# Patient Record
Sex: FEMALE | Race: WHITE | NOT HISPANIC OR LATINO | Employment: FULL TIME | URBAN - METROPOLITAN AREA
[De-identification: names, ages, dates, MRNs, and addresses within clinical notes are randomized per-mention and may not be internally consistent; named-entity substitution may affect disease eponyms.]

---

## 2017-12-05 ENCOUNTER — ALLSCRIPTS OFFICE VISIT (OUTPATIENT)
Dept: OTHER | Facility: OTHER | Age: 52
End: 2017-12-05

## 2017-12-05 ENCOUNTER — LAB REQUISITION (OUTPATIENT)
Dept: LAB | Facility: HOSPITAL | Age: 52
End: 2017-12-05
Payer: COMMERCIAL

## 2017-12-05 DIAGNOSIS — Z11.51 ENCOUNTER FOR SCREENING FOR HUMAN PAPILLOMAVIRUS (HPV): ICD-10-CM

## 2017-12-05 DIAGNOSIS — Z01.419 ENCOUNTER FOR GYNECOLOGICAL EXAMINATION WITHOUT ABNORMAL FINDING: ICD-10-CM

## 2017-12-05 DIAGNOSIS — R53.83 OTHER FATIGUE: ICD-10-CM

## 2017-12-05 DIAGNOSIS — L65.9 NONSCARRING HAIR LOSS: ICD-10-CM

## 2017-12-05 DIAGNOSIS — Z12.31 ENCOUNTER FOR SCREENING MAMMOGRAM FOR MALIGNANT NEOPLASM OF BREAST: ICD-10-CM

## 2017-12-05 PROCEDURE — G0143 SCR C/V CYTO,THINLAYER,RESCR: HCPCS | Performed by: OBSTETRICS & GYNECOLOGY

## 2017-12-05 PROCEDURE — 87624 HPV HI-RISK TYP POOLED RSLT: CPT | Performed by: OBSTETRICS & GYNECOLOGY

## 2017-12-06 NOTE — PROGRESS NOTES
Assessment    1  Encounter for gynecological examination with abnormal finding (V72 31) (Z01 411)    Plan  Encounter for gynecological examination without abnormal finding, Screening for humanpapillomavirus (HPV)    · (1) THIN PREP PAP WITH IMAGING; Status: In Progress - Specimen/DataCollected,Retrospective Authorization;   Done: [id number]   Perform:[facility] In Office Collection; YXY:[id number]; Last Updated [facility]; 12/5/2017 6:48:21 PM;Ordered;for gynecological examination without abnormal finding, Screening for human papillomavirus (HPV); Ordered By:Kati Mclain; Maturation index required? : No  :   HPV? : Regardless of Interpretation  Encounter for screening mammogram for malignant neoplasm of breast    · * MAMMO SCREENING BILATERAL W CAD; Status:Active - Retrospective Authorization; Requested for:[id number];    Perform:[facility]; [phone]; Last Updated Sona Polanco; 12/5/2017 7:17:23 PM;Ordered;for screening mammogram for malignant neoplasm of breast; Ordered By:Kati Mclani; Fatigue    · (1) CBC/PLT/DIFF; Status:Active - Retrospective Authorization; Requestedfor:[id number];    Perform:[facility]; Due:90Kts4283; Last Updated [facility]; 12/5/2017 6:51:07 PM;Ordered;Ordered By:Kati Mclain;   · (1) COMPREHENSIVE METABOLIC PANEL; Status:Active - Retrospective Authorization; Requested for:[id number];    Perform:[facility]; Due:38Obv3538; Last Updated [facility]; 12/5/2017 6:51:07 PM;Ordered;Ordered By:Kati Mclain;   · (1) TSH WITH FT4 REFLEX; Status:Active - Retrospective Authorization; Requestedfor:[id number];    Perform:[facility]; Due:83Ifh2894; Last Updated [facility]; 12/5/2017 6:51:07 PM;Ordered;Ordered By:Kati Mclain; Fatigue, Hair loss    · (1) TESTOSTERONE, FREE (DIRECT) AND TOTAL; Status:Active - RetrospectiveAuthorization;  Requested for:[id number];    Perform:[facility]; Due:09Gzf0723; Last Updated [facility]o Noe; 12/5/2017 6:51:07 PM;Ordered;Hair loss; Ordered By:Rebecca Mclain; Discussion/Summary  health maintenance visit Currently, she eats an adequate diet and has an inadequate exercise regimen  cervical cancer screening is needed every three years HPV and Pap Co-testing Done Today Breast cancer screening: monthly self breast exam was advised, mammogram has been ordered and mammogram is needed every year  Colorectal cancer screening: the risks and benefits of colorectal cancer screening were discussed  Advice and education were given regarding weight bearing exercise and reproductive health  Patient discussion: discussed with the patient  Annual examination was completed  Pap and HPV testing was completed  Prescription was given for mammogram  An extended discussion was had regarding her situational stressors as well as menopausal symptoms  We discussed scheduling for comprehensive laboratory studies including CBC, CMP, TSH, testosterone profile  Patient encouraged to begin an exercise routine dealt with stress reduction  We discussed potential strategies to help with what appeared to be her menopausal symptoms including hormone replacement therapy  Patient will return to the office in 3-4 weeks to assess her laboratory studies and determine a action plan  The patient has the current Goals: Improve health  The patent has the current Barriers: Stressors  Patient is able to Self-Care  Chief Complaint  Yearly- Having trouble emptying her bladder, constipation, insomnia, depression, decreased libido, and hair loss  History of Present Illness  HPI: Patient returns for annual gyn visit  She has been without menses for almost the last year  She has been bothered by significant stressors related to her departure from her previous job at her [employer] she is also subsequently left her [employer]  She is bothered by insomnia  She has hot flashes and night sweats   She is bothered by some urinary emptying difficulties  She has hair loss she also has decreased libido  She does not admit to vaginal dryness  She has never had screening colonoscopy  GYN HM, [facility]: The patient is being seen for a gynecology evaluation  General Health:  Lifestyle:  She exercises regularly  She exercises 3 or more times per week  Exercise includes walking -- She does not use tobacco  The patient is a former cigarette smoker-- and-- quit smoking cigarettes: 20 years  -- She consumes alcohol  She reports occasional alcohol use  She typically drinks wine-- and-- hard liquor  -- She denies drug use  Reproductive health: the patient is postmenopausal--   she is not sexually active  -- pregnancy history: G 4P 3(miscarriages: 1 )  Screening: Cervical cancer screening includes a pap smear performed 10/27/2014,-- human papilloma virus screening performed 10/27/2014-- and-- negative pap / negative HPV  Breast cancer screening includes a mammogram performed 5/15/2015-- and-- negative  She hasn't been previously screened for colorectal cancer  Review of Systems   Constitutional: as noted in HPI   ENT: no ear ache, no loss of hearing, no nosebleeds or nasal discharge, no sore throat or hoarseness  Cardiovascular: no complaints of slow or fast heart rate, no chest pain, no palpitations, no leg claudication or lower extremity edema  Respiratory: no complaints of shortness of breath, no wheezing, no dyspnea on exertion, no orthopnea or PND  Breasts: no complaints of breast pain, breast lump or nipple discharge  Gastrointestinal: no complaints of abdominal pain, no constipation, no nausea or diarrhea, no vomiting, no bloody stools  Genitourinary: as noted in HPI  Musculoskeletal: no complaints of arthralgia, no myalgia, no joint swelling or stiffness, no limb pain or swelling  Integumentary: as noted in HPI    Neurological: no complaints of headache, no confusion, no numbness or tingling, no dizziness or fainting  Active Problems    1  Encounter for gynecological examination without abnormal finding (V72 31) (Z01 419)   2  Encounter for routine gynecological examination (V72 31) (Z01 419)   3  Encounter for screening mammogram for malignant neoplasm of breast (V76 12) (Z12 31)   4  Other specified menopausal and perimenopausal disorders (627 8) (N95 8)   5  Screening for human papillomavirus (HPV) (V73 81) (Z11 51)    Past Medical History   · History of Amenorrhea (626 0) (N91 2)   · History of acne (V13 3) (Z87 2)   · History of menorrhagia (V13 29) (Z87 42)   · History of Irritable bowel syndrome (564 1) (K58 9)    Surgical History   · History of Biopsy Breast Open   · History of Biopsy Endometrial, Without Cervical Dilation   · History of Dilation And Curettage   · History of Gallbladder Surgery    Family History  Mother    · No pertinent family history    Social History     · Former smoker (N32 99) (K46 857)    Current Meds   1  Magnesium CAPS; Therapy: (Recorded:08Byu8959) to Recorded   2  Melatonin TABS; Therapy: (Recorded:49Ump5659) to Recorded   3  Potassium TABS; Therapy: (Recorded:94Snx3295) to Recorded    Allergies  1  Penicillins    Vitals   Recorded: 07IIM3381 54:74CR   Systolic 384, LUE, Sitting   Diastolic 82, LUE, Sitting   Height 5 ft 1 in   Weight 158 lb    BMI Calculated 29 85   BSA Calculated 1 71   LMP        Physical Exam   Constitutional  General appearance: No acute distress, well appearing and well nourished  Neck  Neck: Normal, supple, trachea midline, no masses  Thyroid: Normal, no thyromegaly  Pulmonary  Respiratory effort: No increased work of breathing or signs of respiratory distress  Cardiovascular  Peripheral vascular exam: Normal pulses Throughout  Genitourinary  External genitalia: Normal and no lesions appreciated  Vagina: Normal, no lesions or dryness appreciated     Urethra: Normal    Urethral meatus: Normal    Bladder: Normal, soft, non-tender and no prolapse or masses appreciated  Cervix: Normal, no palpable masses  Uterus: Normal, non-tender, not enlarged, and no palpable masses  Adnexa/parametria: Normal, non-tender and no fullness or masses appreciated  Anus, perineum, and rectum: Normal sphincter tone, no masses, and no prolapse  Chest  Breasts: Normal and no dimpling or skin changes noted  Abdomen  Abdomen: Normal, non-tender, and no organomegaly noted  Liver and spleen: No hepatomegaly or splenomegaly  Examination for hernias: No hernias appreciated  Lymphatic  Palpation of lymph nodes in neck, axillae, groin and/or other locations: No lymphadenopathy or masses noted  Skin  Skin and subcutaneous tissue: Normal skin turgor and no rashes     Palpation of skin and subcutaneous tissue: Normal    Psychiatric  Orientation to person, place, and time: Normal    Mood and affect: Normal        Signatures   Electronically signed by : James Magana DO; Dec  5 2017  7:44PM EST                       (Author)

## 2017-12-07 ENCOUNTER — APPOINTMENT (OUTPATIENT)
Dept: LAB | Facility: CLINIC | Age: 52
End: 2017-12-07
Payer: COMMERCIAL

## 2017-12-07 DIAGNOSIS — L65.9 NONSCARRING HAIR LOSS: ICD-10-CM

## 2017-12-07 DIAGNOSIS — R53.83 OTHER FATIGUE: ICD-10-CM

## 2017-12-07 LAB
ALBUMIN SERPL BCP-MCNC: 3.8 G/DL (ref 3.5–5)
ALP SERPL-CCNC: 69 U/L (ref 46–116)
ALT SERPL W P-5'-P-CCNC: 19 U/L (ref 12–78)
ANION GAP SERPL CALCULATED.3IONS-SCNC: 6 MMOL/L (ref 4–13)
AST SERPL W P-5'-P-CCNC: 14 U/L (ref 5–45)
BASOPHILS # BLD AUTO: 0.02 THOUSANDS/ΜL (ref 0–0.1)
BASOPHILS NFR BLD AUTO: 1 % (ref 0–1)
BILIRUB SERPL-MCNC: 0.57 MG/DL (ref 0.2–1)
BUN SERPL-MCNC: 12 MG/DL (ref 5–25)
CALCIUM SERPL-MCNC: 9.1 MG/DL (ref 8.3–10.1)
CHLORIDE SERPL-SCNC: 105 MMOL/L (ref 100–108)
CO2 SERPL-SCNC: 29 MMOL/L (ref 21–32)
CREAT SERPL-MCNC: 0.81 MG/DL (ref 0.6–1.3)
EOSINOPHIL # BLD AUTO: 0.04 THOUSAND/ΜL (ref 0–0.61)
EOSINOPHIL NFR BLD AUTO: 1 % (ref 0–6)
ERYTHROCYTE [DISTWIDTH] IN BLOOD BY AUTOMATED COUNT: 12.6 % (ref 11.6–15.1)
GFR SERPL CREATININE-BSD FRML MDRD: 83 ML/MIN/1.73SQ M
GLUCOSE P FAST SERPL-MCNC: 76 MG/DL (ref 65–99)
HCT VFR BLD AUTO: 40.4 % (ref 34.8–46.1)
HGB BLD-MCNC: 13.5 G/DL (ref 11.5–15.4)
LYMPHOCYTES # BLD AUTO: 0.94 THOUSANDS/ΜL (ref 0.6–4.47)
LYMPHOCYTES NFR BLD AUTO: 25 % (ref 14–44)
MCH RBC QN AUTO: 29.5 PG (ref 26.8–34.3)
MCHC RBC AUTO-ENTMCNC: 33.4 G/DL (ref 31.4–37.4)
MCV RBC AUTO: 88 FL (ref 82–98)
MONOCYTES # BLD AUTO: 0.27 THOUSAND/ΜL (ref 0.17–1.22)
MONOCYTES NFR BLD AUTO: 7 % (ref 4–12)
NEUTROPHILS # BLD AUTO: 2.45 THOUSANDS/ΜL (ref 1.85–7.62)
NEUTS SEG NFR BLD AUTO: 66 % (ref 43–75)
NRBC BLD AUTO-RTO: 0 /100 WBCS
PLATELET # BLD AUTO: 175 THOUSANDS/UL (ref 149–390)
PMV BLD AUTO: 10.1 FL (ref 8.9–12.7)
POTASSIUM SERPL-SCNC: 3.9 MMOL/L (ref 3.5–5.3)
PROT SERPL-MCNC: 7.6 G/DL (ref 6.4–8.2)
RBC # BLD AUTO: 4.57 MILLION/UL (ref 3.81–5.12)
SODIUM SERPL-SCNC: 140 MMOL/L (ref 136–145)
TSH SERPL DL<=0.05 MIU/L-ACNC: 2.46 UIU/ML (ref 0.36–3.74)
WBC # BLD AUTO: 3.73 THOUSAND/UL (ref 4.31–10.16)

## 2017-12-07 PROCEDURE — 80053 COMPREHEN METABOLIC PANEL: CPT

## 2017-12-07 PROCEDURE — 84403 ASSAY OF TOTAL TESTOSTERONE: CPT

## 2017-12-07 PROCEDURE — 84402 ASSAY OF FREE TESTOSTERONE: CPT

## 2017-12-07 PROCEDURE — 36415 COLL VENOUS BLD VENIPUNCTURE: CPT

## 2017-12-07 PROCEDURE — 85025 COMPLETE CBC W/AUTO DIFF WBC: CPT

## 2017-12-07 PROCEDURE — 84443 ASSAY THYROID STIM HORMONE: CPT

## 2017-12-08 LAB
TESTOST FREE SERPL-MCNC: 0.4 PG/ML (ref 0–4.2)
TESTOST SERPL-MCNC: <3 NG/DL (ref 3–41)

## 2017-12-12 LAB — HPV RRNA GENITAL QL NAA+PROBE: NORMAL

## 2017-12-13 ENCOUNTER — GENERIC CONVERSION - ENCOUNTER (OUTPATIENT)
Dept: OTHER | Facility: OTHER | Age: 52
End: 2017-12-13

## 2017-12-13 LAB
LAB AP GYN PRIMARY INTERPRETATION: NORMAL
Lab: NORMAL

## 2017-12-18 ENCOUNTER — GENERIC CONVERSION - ENCOUNTER (OUTPATIENT)
Dept: OTHER | Facility: OTHER | Age: 52
End: 2017-12-18

## 2018-01-08 ENCOUNTER — GENERIC CONVERSION - ENCOUNTER (OUTPATIENT)
Dept: OTHER | Facility: OTHER | Age: 53
End: 2018-01-08

## 2018-01-23 VITALS
HEIGHT: 61 IN | DIASTOLIC BLOOD PRESSURE: 82 MMHG | SYSTOLIC BLOOD PRESSURE: 130 MMHG | WEIGHT: 158 LBS | BODY MASS INDEX: 29.83 KG/M2

## 2018-01-23 NOTE — MISCELLANEOUS
Message   Recorded as Task   Date: 12/13/2017 12:43 PM, Created By: Bowen Kyle   Task Name: Call Back   Assigned To: Rafaela Joyce   Regarding Patient: Vianca Kamara, Status: Active   Comment:    Kadi Lance - 13 Dec 2017 12:43 PM     TASK CREATED  Naima Fox- THIS PT RETURNED YOUR CALL ABOUT AN APPT  [phone]   Rafaela Joyce - 13 Dec 2017 12:51 PM     TASK REASSIGNED: Previously Assigned To Rafaela Joyce  just letting you know this pt doesn't want to make an appointment with you to come back in unless there is something wrong with her bloodwork  I made her annual apt for next year  let me know what you think but she is fine and only wants to come in if necessary   Bear Mclain - 13 Dec 2017 10:40 PM     TASK REPLIED TO: Previously Assigned To Servando Fierro  that's fine   she was suffering with hot flashes   we thought that she would come back to discuss hormone therapy   Mariella Boston - 15 Dec 2017 8:19 AM     TASK EDITED  [facility] to cb if she wants an apt   Mariella Boston - 18 Dec 2017 10:27 AM     TASK EDITED  apt made for 1/8/18 at 5:00 in [place]        Active Problems    1  Encounter for gynecological examination with abnormal finding (V72 31) (Z01 411)   2  Encounter for gynecological examination without abnormal finding (V72 31) (Z01 419)   3  Encounter for routine gynecological examination (V72 31) (Z01 419)   4  Encounter for screening mammogram for malignant neoplasm of breast (V76 12)   (Z12 31)   5  Fatigue (780 79) (R53 83)   6  Hair loss (704 00) (L65 9)   7  Other specified menopausal and perimenopausal disorders (627 8) (N95 8)   8  Screening for human papillomavirus (HPV) (V73 81) (Z11 51)    Current Meds   1  Magnesium CAPS; Therapy: (Recorded:99Deh0159) to Recorded   2  Melatonin TABS; Therapy: (Recorded:75Fhf1095) to Recorded   3  Potassium TABS; Therapy: (Recorded:37Eyn8726) to Recorded    Allergies    1   Penicillins    Signatures   Electronically signed by : Jo Suh, ; Dec 18 2017 10: 27AM EST                       (Author)

## 2018-01-23 NOTE — MISCELLANEOUS
Message   Recorded as Task   Date: 12/05/2017 07:42 PM, Created By: Edie Miranda   Task Name: Care Coordination   Assigned To: Brenita Pump   Regarding Patient: Erik Martinez, Status: Active   CommentWalt Fulling - 05 Dec 2017 7:42 PM     Erika Soliman wants to look at his schedule with you for this patient  She needs a 5:40 or after  I tried giving her a 4:40 but she is unable to make it at that time  Brenita Pump - 06 Dec 2017 3:06 PM     TASK EDITED  [facility] to cb   Anyi Boo - 11 Dec 2017 4:34 PM     TASK EDITED  pt returning call  she is @ [phone] appt for a follow up  Brenita Pump - 13 Dec 2017 12:51 PM     TASK EDITED  called pt and set up annual apt with [facility]K for next year  pt does not want to come in unless blood wrk is abnormal  tasked RK to make a decision        Active Problems    1  Encounter for gynecological examination with abnormal finding (V72 31) (Z01 411)   2  Encounter for gynecological examination without abnormal finding (V72 31) (Z01 419)   3  Encounter for routine gynecological examination (V72 31) (Z01 419)   4  Encounter for screening mammogram for malignant neoplasm of breast (V76 12)   (Z12 31)   5  Fatigue (780 79) (R53 83)   6  Hair loss (704 00) (L65 9)   7  Other specified menopausal and perimenopausal disorders (627 8) (N95 8)   8  Screening for human papillomavirus (HPV) (V73 81) (Z11 51)    Current Meds   1  Magnesium CAPS; Therapy: (Recorded:63Xur1528) to Recorded   2  Melatonin TABS; Therapy: (Recorded:56Tyk4838) to Recorded   3  Potassium TABS; Therapy: (Recorded:79Prr8792) to Recorded    Allergies    1   Penicillins    Signatures   Electronically signed by : Carmita Ashby, ; Dec 13 2017 12:52PM EST                       (Author)

## 2018-01-24 VITALS
BODY MASS INDEX: 30.78 KG/M2 | SYSTOLIC BLOOD PRESSURE: 136 MMHG | WEIGHT: 163 LBS | DIASTOLIC BLOOD PRESSURE: 84 MMHG | HEIGHT: 61 IN

## 2018-10-12 ENCOUNTER — TRANSCRIBE ORDERS (OUTPATIENT)
Dept: LAB | Facility: CLINIC | Age: 53
End: 2018-10-12

## 2018-10-12 ENCOUNTER — APPOINTMENT (OUTPATIENT)
Dept: LAB | Facility: CLINIC | Age: 53
End: 2018-10-12
Payer: COMMERCIAL

## 2018-10-12 DIAGNOSIS — G47.00 PERSISTENT DISORDER OF INITIATING OR MAINTAINING SLEEP: Primary | ICD-10-CM

## 2018-10-12 DIAGNOSIS — Z73.3 STRESS, NOT ELSEWHERE CLASSIFIED: ICD-10-CM

## 2018-10-12 DIAGNOSIS — Z00.00 ROUTINE GENERAL MEDICAL EXAMINATION AT A HEALTH CARE FACILITY: ICD-10-CM

## 2018-10-12 LAB
ALBUMIN SERPL BCP-MCNC: 3.5 G/DL (ref 3.5–5)
ALP SERPL-CCNC: 64 U/L (ref 46–116)
ALT SERPL W P-5'-P-CCNC: 19 U/L (ref 12–78)
ANION GAP SERPL CALCULATED.3IONS-SCNC: 5 MMOL/L (ref 4–13)
AST SERPL W P-5'-P-CCNC: 15 U/L (ref 5–45)
BASOPHILS # BLD AUTO: 0.02 THOUSANDS/ΜL (ref 0–0.1)
BASOPHILS NFR BLD AUTO: 1 % (ref 0–1)
BILIRUB SERPL-MCNC: 0.36 MG/DL (ref 0.2–1)
BUN SERPL-MCNC: 13 MG/DL (ref 5–25)
CALCIUM SERPL-MCNC: 8.8 MG/DL (ref 8.3–10.1)
CHLORIDE SERPL-SCNC: 108 MMOL/L (ref 100–108)
CHOLEST SERPL-MCNC: 137 MG/DL (ref 50–200)
CO2 SERPL-SCNC: 29 MMOL/L (ref 21–32)
CREAT SERPL-MCNC: 0.79 MG/DL (ref 0.6–1.3)
EOSINOPHIL # BLD AUTO: 0.04 THOUSAND/ΜL (ref 0–0.61)
EOSINOPHIL NFR BLD AUTO: 1 % (ref 0–6)
ERYTHROCYTE [DISTWIDTH] IN BLOOD BY AUTOMATED COUNT: 12.5 % (ref 11.6–15.1)
GFR SERPL CREATININE-BSD FRML MDRD: 85 ML/MIN/1.73SQ M
GLUCOSE P FAST SERPL-MCNC: 86 MG/DL (ref 65–99)
HCT VFR BLD AUTO: 38.7 % (ref 34.8–46.1)
HDLC SERPL-MCNC: 53 MG/DL (ref 40–60)
HGB BLD-MCNC: 12.7 G/DL (ref 11.5–15.4)
IMM GRANULOCYTES # BLD AUTO: 0.01 THOUSAND/UL (ref 0–0.2)
IMM GRANULOCYTES NFR BLD AUTO: 0 % (ref 0–2)
LDLC SERPL CALC-MCNC: 73 MG/DL (ref 0–100)
LYMPHOCYTES # BLD AUTO: 0.71 THOUSANDS/ΜL (ref 0.6–4.47)
LYMPHOCYTES NFR BLD AUTO: 21 % (ref 14–44)
MCH RBC QN AUTO: 29.8 PG (ref 26.8–34.3)
MCHC RBC AUTO-ENTMCNC: 32.8 G/DL (ref 31.4–37.4)
MCV RBC AUTO: 91 FL (ref 82–98)
MONOCYTES # BLD AUTO: 0.28 THOUSAND/ΜL (ref 0.17–1.22)
MONOCYTES NFR BLD AUTO: 8 % (ref 4–12)
NEUTROPHILS # BLD AUTO: 2.4 THOUSANDS/ΜL (ref 1.85–7.62)
NEUTS SEG NFR BLD AUTO: 69 % (ref 43–75)
NONHDLC SERPL-MCNC: 84 MG/DL
NRBC BLD AUTO-RTO: 0 /100 WBCS
PLATELET # BLD AUTO: 161 THOUSANDS/UL (ref 149–390)
PMV BLD AUTO: 10 FL (ref 8.9–12.7)
POTASSIUM SERPL-SCNC: 4.1 MMOL/L (ref 3.5–5.3)
PROT SERPL-MCNC: 7 G/DL (ref 6.4–8.2)
RBC # BLD AUTO: 4.26 MILLION/UL (ref 3.81–5.12)
SODIUM SERPL-SCNC: 142 MMOL/L (ref 136–145)
T4 FREE SERPL-MCNC: 0.96 NG/DL (ref 0.76–1.46)
TRIGL SERPL-MCNC: 54 MG/DL
TSH SERPL DL<=0.05 MIU/L-ACNC: 1.6 UIU/ML (ref 0.36–3.74)
WBC # BLD AUTO: 3.46 THOUSAND/UL (ref 4.31–10.16)

## 2018-10-12 PROCEDURE — 80061 LIPID PANEL: CPT

## 2018-10-12 PROCEDURE — 36415 COLL VENOUS BLD VENIPUNCTURE: CPT

## 2018-10-12 PROCEDURE — 80053 COMPREHEN METABOLIC PANEL: CPT

## 2018-10-12 PROCEDURE — 84443 ASSAY THYROID STIM HORMONE: CPT

## 2018-10-12 PROCEDURE — 84439 ASSAY OF FREE THYROXINE: CPT

## 2018-10-12 PROCEDURE — 85025 COMPLETE CBC W/AUTO DIFF WBC: CPT

## 2018-12-10 ENCOUNTER — ANNUAL EXAM (OUTPATIENT)
Dept: OBGYN CLINIC | Facility: CLINIC | Age: 53
End: 2018-12-10
Payer: COMMERCIAL

## 2018-12-10 VITALS
DIASTOLIC BLOOD PRESSURE: 80 MMHG | HEIGHT: 61 IN | SYSTOLIC BLOOD PRESSURE: 142 MMHG | WEIGHT: 168.4 LBS | BODY MASS INDEX: 31.79 KG/M2

## 2018-12-10 DIAGNOSIS — Z12.39 SCREENING FOR MALIGNANT NEOPLASM OF BREAST: ICD-10-CM

## 2018-12-10 DIAGNOSIS — Z01.419 ENCOUNTER FOR GYNECOLOGICAL EXAMINATION (GENERAL) (ROUTINE) WITHOUT ABNORMAL FINDINGS: Primary | ICD-10-CM

## 2018-12-10 PROCEDURE — S0612 ANNUAL GYNECOLOGICAL EXAMINA: HCPCS | Performed by: OBSTETRICS & GYNECOLOGY

## 2018-12-10 RX ORDER — PHENOL 1.4 %
AEROSOL, SPRAY (ML) MUCOUS MEMBRANE
COMMUNITY
End: 2018-12-10 | Stop reason: ALTCHOICE

## 2018-12-10 NOTE — PROGRESS NOTES
Assessment/Plan:    No problem-specific Assessment & Plan notes found for this encounter  Diagnoses and all orders for this visit:    Encounter for gynecological examination (general) (routine) without abnormal findings    Screening for malignant neoplasm of breast  -     Mammo screening bilateral w cad; Future    Other orders  -     Discontinue: Magnesium Oxide -Mg Supplement 400 MG CAPS; Take by mouth  -     Discontinue: Melatonin 10 MG TABS; Take by mouth  -     Discontinue: Potassium 75 MG TABS; Take by mouth        Annual examination was completed  Mammogram was ordered  Patient has received a colo guard kit through her primary care physician I have encouraged her to proceed with that testing  We then had a extended discussion regarding the challenges with management of libido and the multifactorial components impacting it  Patient will have a trial of over-the-counter Avlimil  We also discussed the benefits of arranging date nights with her  to help stimulate intimacy  Patient will call me if she has questions or concerns  She will return to the office in 1 year or as necessary  Subjective:      Patient ID: Valentin Kee is a 46 y o  female  Patient returns for annual gyn visit  She has no new medical surgical issues  Unfortunately she has severe exacerbation of depression with trial of Estratest   She continues to be disturbed with decreased libido and has continued to cause stressors in her relationship  She is open to further strategies  Gynecologic Exam         The following portions of the patient's history were reviewed and updated as appropriate:   She  has a past medical history of H/O amenorrhea; H/O menorrhagia; History of IBS; and acne    She   Patient Active Problem List    Diagnosis Date Noted    Encounter for gynecological examination (general) (routine) without abnormal findings 12/10/2018    Screening for malignant neoplasm of breast 12/10/2018     She  has a past surgical history that includes Breast biopsy; Endometrial biopsy; Dilation and curettage of uterus; and Gallbladder surgery  Her family history is not on file  She  reports that she quit smoking about 28 years ago  She has never used smokeless tobacco  She reports that she drinks alcohol  She reports that she does not use drugs  No current outpatient prescriptions on file  No current facility-administered medications for this visit  No current outpatient prescriptions on file prior to visit  No current facility-administered medications on file prior to visit  She is allergic to penicillins       Review of Systems   All other systems reviewed and are negative  Objective:      /80 (BP Location: Left arm, Patient Position: Sitting, Cuff Size: Standard)   Ht 5' 1" (1 549 m)   Wt 76 4 kg (168 lb 6 4 oz)   BMI 31 82 kg/m²          Physical Exam   Constitutional: She is oriented to person, place, and time  She appears well-developed and well-nourished  HENT:   Head: Normocephalic and atraumatic  Nose: Nose normal    Eyes: Pupils are equal, round, and reactive to light  EOM are normal    Neck: Normal range of motion  Neck supple  No thyromegaly present  Cardiovascular: Normal rate and regular rhythm  Pulmonary/Chest: Effort normal and breath sounds normal  No respiratory distress  Breasts symmetrical without masses, skin changes or adenopathy   Abdominal: Soft  Bowel sounds are normal  She exhibits no distension and no mass  There is no tenderness  Hernia confirmed negative in the right inguinal area and confirmed negative in the left inguinal area  Genitourinary: Vagina normal and uterus normal  No breast swelling, tenderness, discharge or bleeding  Pelvic exam was performed with patient supine  No labial fusion  There is no rash, tenderness, lesion or injury on the right labia  There is no rash, tenderness, lesion or injury on the left labia   Cervix exhibits no motion tenderness, no discharge and no friability  Genitourinary Comments: Ext gen nl w/o lesions  Vag healthy w/o lesions or discharge  Cervix healthy w/o lesions  Uterus nl size, NT  Adnexa NT no masses  Rectal no masses   Musculoskeletal: Normal range of motion  She exhibits no edema  Lymphadenopathy:        Right: No inguinal adenopathy present  Left: No inguinal adenopathy present  Neurological: She is alert and oriented to person, place, and time  She has normal reflexes  Skin: Skin is warm and dry  No rash noted  Psychiatric: She has a normal mood and affect  Her behavior is normal  Thought content normal    Nursing note and vitals reviewed

## 2019-02-09 ENCOUNTER — HOSPITAL ENCOUNTER (OUTPATIENT)
Dept: RADIOLOGY | Age: 54
Discharge: HOME | End: 2019-02-09
Payer: COMMERCIAL

## 2019-02-09 VITALS — BODY MASS INDEX: 31.72 KG/M2 | HEIGHT: 61 IN | WEIGHT: 168 LBS

## 2019-02-09 DIAGNOSIS — Z12.39 SCREENING FOR MALIGNANT NEOPLASM OF BREAST: ICD-10-CM

## 2019-02-09 PROCEDURE — 77067 SCR MAMMO BI INCL CAD: CPT

## 2019-06-08 ENCOUNTER — APPOINTMENT (OUTPATIENT)
Dept: RADIOLOGY | Age: 54
End: 2019-06-08
Payer: COMMERCIAL

## 2019-06-08 ENCOUNTER — TRANSCRIBE ORDERS (OUTPATIENT)
Dept: ADMINISTRATIVE | Age: 54
End: 2019-06-08

## 2019-06-08 DIAGNOSIS — R09.1 PLEURISY WITHOUT EFFUSION: ICD-10-CM

## 2019-06-08 DIAGNOSIS — R09.1 PLEURISY WITHOUT EFFUSION: Primary | ICD-10-CM

## 2019-06-08 PROCEDURE — 71046 X-RAY EXAM CHEST 2 VIEWS: CPT

## 2019-11-16 ENCOUNTER — TRANSCRIBE ORDERS (OUTPATIENT)
Dept: LAB | Facility: CLINIC | Age: 54
End: 2019-11-16

## 2019-11-16 ENCOUNTER — APPOINTMENT (OUTPATIENT)
Dept: LAB | Facility: CLINIC | Age: 54
End: 2019-11-16
Payer: COMMERCIAL

## 2019-11-16 DIAGNOSIS — Z00.00 ROUTINE GENERAL MEDICAL EXAMINATION AT A HEALTH CARE FACILITY: ICD-10-CM

## 2019-11-16 DIAGNOSIS — Z00.00 ROUTINE GENERAL MEDICAL EXAMINATION AT A HEALTH CARE FACILITY: Primary | ICD-10-CM

## 2019-11-16 LAB
ALBUMIN SERPL BCP-MCNC: 3.5 G/DL (ref 3.5–5)
ALP SERPL-CCNC: 78 U/L (ref 46–116)
ALT SERPL W P-5'-P-CCNC: 21 U/L (ref 12–78)
ANION GAP SERPL CALCULATED.3IONS-SCNC: 7 MMOL/L (ref 4–13)
AST SERPL W P-5'-P-CCNC: 18 U/L (ref 5–45)
BASOPHILS # BLD AUTO: 0.03 THOUSANDS/ΜL (ref 0–0.1)
BASOPHILS NFR BLD AUTO: 1 % (ref 0–1)
BILIRUB SERPL-MCNC: 0.6 MG/DL (ref 0.2–1)
BUN SERPL-MCNC: 13 MG/DL (ref 5–25)
CALCIUM SERPL-MCNC: 8.5 MG/DL (ref 8.3–10.1)
CHLORIDE SERPL-SCNC: 105 MMOL/L (ref 100–108)
CHOLEST SERPL-MCNC: 170 MG/DL (ref 50–200)
CO2 SERPL-SCNC: 30 MMOL/L (ref 21–32)
CREAT SERPL-MCNC: 0.92 MG/DL (ref 0.6–1.3)
EOSINOPHIL # BLD AUTO: 0.09 THOUSAND/ΜL (ref 0–0.61)
EOSINOPHIL NFR BLD AUTO: 2 % (ref 0–6)
ERYTHROCYTE [DISTWIDTH] IN BLOOD BY AUTOMATED COUNT: 12.9 % (ref 11.6–15.1)
GFR SERPL CREATININE-BSD FRML MDRD: 70 ML/MIN/1.73SQ M
GLUCOSE P FAST SERPL-MCNC: 82 MG/DL (ref 65–99)
HCT VFR BLD AUTO: 39.4 % (ref 34.8–46.1)
HDLC SERPL-MCNC: 55 MG/DL
HGB BLD-MCNC: 12.7 G/DL (ref 11.5–15.4)
IMM GRANULOCYTES # BLD AUTO: 0.01 THOUSAND/UL (ref 0–0.2)
IMM GRANULOCYTES NFR BLD AUTO: 0 % (ref 0–2)
LDLC SERPL CALC-MCNC: 104 MG/DL (ref 0–100)
LYMPHOCYTES # BLD AUTO: 0.83 THOUSANDS/ΜL (ref 0.6–4.47)
LYMPHOCYTES NFR BLD AUTO: 21 % (ref 14–44)
MCH RBC QN AUTO: 29.4 PG (ref 26.8–34.3)
MCHC RBC AUTO-ENTMCNC: 32.2 G/DL (ref 31.4–37.4)
MCV RBC AUTO: 91 FL (ref 82–98)
MONOCYTES # BLD AUTO: 0.33 THOUSAND/ΜL (ref 0.17–1.22)
MONOCYTES NFR BLD AUTO: 8 % (ref 4–12)
NEUTROPHILS # BLD AUTO: 2.68 THOUSANDS/ΜL (ref 1.85–7.62)
NEUTS SEG NFR BLD AUTO: 68 % (ref 43–75)
NONHDLC SERPL-MCNC: 115 MG/DL
NRBC BLD AUTO-RTO: 0 /100 WBCS
PLATELET # BLD AUTO: 150 THOUSANDS/UL (ref 149–390)
PMV BLD AUTO: 8.9 FL (ref 8.9–12.7)
POTASSIUM SERPL-SCNC: 3.9 MMOL/L (ref 3.5–5.3)
PROT SERPL-MCNC: 7.3 G/DL (ref 6.4–8.2)
RBC # BLD AUTO: 4.32 MILLION/UL (ref 3.81–5.12)
SODIUM SERPL-SCNC: 142 MMOL/L (ref 136–145)
TRIGL SERPL-MCNC: 57 MG/DL
TSH SERPL DL<=0.05 MIU/L-ACNC: 2.08 UIU/ML (ref 0.36–3.74)
WBC # BLD AUTO: 3.97 THOUSAND/UL (ref 4.31–10.16)

## 2019-11-16 PROCEDURE — 80053 COMPREHEN METABOLIC PANEL: CPT

## 2019-11-16 PROCEDURE — 36415 COLL VENOUS BLD VENIPUNCTURE: CPT

## 2019-11-16 PROCEDURE — 84443 ASSAY THYROID STIM HORMONE: CPT

## 2019-11-16 PROCEDURE — 85025 COMPLETE CBC W/AUTO DIFF WBC: CPT

## 2019-11-16 PROCEDURE — 80061 LIPID PANEL: CPT

## 2020-01-21 ENCOUNTER — ANNUAL EXAM (OUTPATIENT)
Dept: OBGYN CLINIC | Facility: CLINIC | Age: 55
End: 2020-01-21
Payer: COMMERCIAL

## 2020-01-21 VITALS
SYSTOLIC BLOOD PRESSURE: 138 MMHG | DIASTOLIC BLOOD PRESSURE: 88 MMHG | BODY MASS INDEX: 33.91 KG/M2 | HEIGHT: 61 IN | WEIGHT: 179.6 LBS

## 2020-01-21 DIAGNOSIS — Z12.31 ENCOUNTER FOR SCREENING MAMMOGRAM FOR MALIGNANT NEOPLASM OF BREAST: ICD-10-CM

## 2020-01-21 DIAGNOSIS — Z01.419 ENCOUNTER FOR GYNECOLOGICAL EXAMINATION (GENERAL) (ROUTINE) WITHOUT ABNORMAL FINDINGS: Primary | ICD-10-CM

## 2020-01-21 DIAGNOSIS — Z87.42 HISTORY OF POSTMENOPAUSAL BLEEDING: ICD-10-CM

## 2020-01-21 PROCEDURE — S0612 ANNUAL GYNECOLOGICAL EXAMINA: HCPCS | Performed by: STUDENT IN AN ORGANIZED HEALTH CARE EDUCATION/TRAINING PROGRAM

## 2020-01-21 NOTE — PROGRESS NOTES
Established patient annual exam - Gynecology   Miguel Sharma 53 y o  female MRN: [id number]  227 M  [facility]  Encounter: [id number]    Chief Complaint   Patient presents with    Gynecologic Exam     Yearly exam, last pap was 17 neg/HPV neg, mammo done 19, order given  Colonoscopy has not been done  She is , has no libido  History of Present Illness     Miguel Sharma is a 53 y o  female  No LMP recorded  Patient is postmenopausal  postmenopausal, who presents for for annual exam   Last seen by Dr Marco Rubio 12/10/2018  Concerns today: Had small amount of spotting on her underwear approximately 3 months ago  Also continues with symptoms of low libido  Did take Dr Angel Gibbons advice of setting a date night  She reports it did go well but otherwise has not had any improvement in her symptoms  Tried treatment but worsened depression  REVIEW OF SYSTEMS  CONSTITUTIONAL:  No weight loss, fever, chills, weakness  HEENT: No visual loss, blurred vision  Vertigo baseline  SKIN: No rash or itching  CARDIOVASCULAR: No chest pain, chest pressure, or chest discomfort  RESPIRATORY: No shortness of breath, cough or sputum  GASTROINTESTINAL: No nausea, emesis, or diarrhea  NEUROLOGICAL: No headache, dizziness, syncope, paralysis  MUSCUOSKELETAL: No muscle, back pain, joint stiffness or bruising  INFECTIOUS: No fever, chills  PSYCHIATRIC: No disorder of thought or mood  HEMATOLOGIC: No easy bruising or bleeding  GYN: Postmenopausal bleeding as described above    Decreased libido as above    Past Medical History:   Diagnosis Date    H/O amenorrhea     H/O menorrhagia     History of IBS     Hx of acne        Patient Active Problem List   Diagnosis    Encounter for gynecological examination (general) (routine) without abnormal findings    Screening for malignant neoplasm of breast       Past Surgical History:   Procedure Laterality Date    BREAST EXCISIONAL BIOPSY Right     DILATION AND CURETTAGE OF UTERUS      ENDOMETRIAL BIOPSY      GALLBLADDER SURGERY         OB History        4    Para   3    Term   3            AB   1    Living           SAB   1    TAB        Ectopic        Multiple        Live Births   3                 #: 1, Date: None, Sex: None, Weight: None, GA: None, Delivery: None, Apgar1: None, Apgar5: None, Living: None, Birth Comments: None    #: 2, Date: None, Sex: None, Weight: None, GA: None, Delivery: None, Apgar1: None, Apgar5: None, Living: None, Birth Comments: None    #: 3, Date: None, Sex: None, Weight: None, GA: None, Delivery: None, Apgar1: None, Apgar5: None, Living: None, Birth Comments: None    #: 4, Date: None, Sex: None, Weight: None, GA: None, Delivery: None, Apgar1: None, Apgar5: None, Living: None, Birth Comments: None       GYN History    LMP years ago    No history abnormal Pap smears  No history of cryotherapy, LEEP, or cold knife cone of the cervix    Health maintenance  Last pap smear: 2017 NILM, HPV neg  Bone density scan: n/a  Last mammogram: 2019 birads cat 1 neg  Last colonoscopy: Not on file declined cologard and colonoscopy, discussed with PCP  HPV vaccination?: no    Family History   Problem Relation Age of Onset    Colon cancer Maternal Grandmother 66       Social History   Social History     Substance and Sexual Activity   Alcohol Use Yes    Comment: occasional     Social History     Substance and Sexual Activity   Drug Use No     Social History     Tobacco Use   Smoking Status Former Smoker    Last attempt to quit: 1990    Years since quittin 0   Smokeless Tobacco Never Used       Sexually active? occasionally  Current sexual partner(s):   History of STD: no  Interested in STD screening today? no  Concerns about sex: low libido      No current outpatient medications on file      Allergies   Allergen Reactions    Penicillins Rash       Objective   Vitals: Blood pressure 138/88, height 5' 1 25" (1 556 m), weight 81 5 kg (179 lb 9 6 oz), not currently breastfeeding  Body mass index is 33 66 kg/m²  General: NAD, AAOx3  Heart: RRR  Lungs: CTAB  Neck: supple, no thyromegaly or thyroid nodules appreciated    Breast: nipples everted bilaterally, no skin changes  No dimpling, redness, or erythema  No breast masses or axillary masses bilaterally  Fibrocystic breast changes  Abdomen: soft, non-distended, non tender to palpation  Extremities: non-tender to palpation  Speculum exam: Normal appearing external genitalia, normal hair distribution  Urethra well-suspended, no clitoromegaly noted  Vagina pale, minimal rugation  Physiologic discharge noted  Cervix without lesion, postmenopausal appearing  No blood in vaginal vault    Pelvic exam: No cervical motion tenderness  No adnexal masses or tenderness  anteverted uterus, normal size  Lab Results:   No visits with results within 1 Day(s) from this visit     Latest known visit with results is:   Appointment on 11/16/2019   Component Date Value    Sodium 11/16/2019 142     Potassium 11/16/2019 3 9     Chloride 11/16/2019 105     CO2 11/16/2019 30     ANION GAP 11/16/2019 7     BUN 11/16/2019 13     Creatinine 11/16/2019 0 92     Glucose, Fasting 11/16/2019 82     Calcium 11/16/2019 8 5     AST 11/16/2019 18     ALT 11/16/2019 21     Alkaline Phosphatase 11/16/2019 78     Total Protein 11/16/2019 7 3     Albumin 11/16/2019 3 5     Total Bilirubin 11/16/2019 0 60     eGFR 11/16/2019 70     WBC 11/16/2019 3 97*    RBC 11/16/2019 4 32     Hemoglobin 11/16/2019 12 7     Hematocrit 11/16/2019 39 4     MCV 11/16/2019 91     MCH 11/16/2019 29 4     MCHC 11/16/2019 32 2     RDW 11/16/2019 12 9     MPV 11/16/2019 8 9     Platelets 49/66/0116 150     nRBC 11/16/2019 0     Neutrophils Relative 11/16/2019 68     Immat GRANS % 11/16/2019 0     Lymphocytes Relative 11/16/2019 21     Monocytes Relative 2019 8     Eosinophils Relative 2019 2     Basophils Relative 2019 1     Neutrophils Absolute 2019 2 68     Immature Grans Absolute 2019 0 01     Lymphocytes Absolute 2019 0 83     Monocytes Absolute 2019 0 33     Eosinophils Absolute 2019 0 09     Basophils Absolute 2019 0 03     Cholesterol 2019 170     Triglycerides 2019 57     HDL, Direct 2019 55     LDL Calculated 2019 104*    Non-HDL-Chol (CHOL-HDL) 2019 115     TSH 3RD GENERATON 2019 2 084         Assessment/Plan     53 y o   with normal annual gynecologic examination  Diagnoses and all orders for this visit:    Encounter for gynecological examination (general) (routine) without abnormal findings   - No abnormalities on exam today  Reviewed decreased libido, cycle of desire  Also reviewed depression and effect on libido  Not interested in any medication at this time, will inform us if she does want  Encounter for screening mammogram for malignant neoplasm of breast  -     Mammo screening bilateral w cad; Future   - Reordered today, scheduled  History of postmenopausal bleeding  -     US pelvis complete w transvaginal; Future   - Reviewed risk of postmenopausal bleeding, rule out endometrial cancer  Plan at this time to get transvaginal ultrasound to assess endometrial cavity thickness    If >4 mm, will plan for sampling in the form of endometrial biopsy    Follow up PRN or for one year annual exam

## 2020-02-07 ENCOUNTER — TELEPHONE (OUTPATIENT)
Dept: OBGYN CLINIC | Facility: CLINIC | Age: 55
End: 2020-02-07

## 2020-02-07 NOTE — TELEPHONE ENCOUNTER
Pt contacted and advised friendly reminder showing Abiela Ponce is scheduled for 02/15/2020 and thanked her  Advised pelvic u/s due not showing scheduled  Asked if she needed copy of order  Pt stated she is still thinking about it  Doesn't know she will have it done or not

## 2020-02-15 ENCOUNTER — HOSPITAL ENCOUNTER (OUTPATIENT)
Dept: RADIOLOGY | Age: 55
Discharge: HOME | End: 2020-02-15
Payer: COMMERCIAL

## 2020-02-15 VITALS — WEIGHT: 179 LBS | BODY MASS INDEX: 33.79 KG/M2 | HEIGHT: 61 IN

## 2020-02-15 DIAGNOSIS — Z12.31 ENCOUNTER FOR SCREENING MAMMOGRAM FOR MALIGNANT NEOPLASM OF BREAST: ICD-10-CM

## 2020-02-15 PROCEDURE — 77067 SCR MAMMO BI INCL CAD: CPT

## 2020-12-19 ENCOUNTER — TRANSCRIBE ORDERS (OUTPATIENT)
Dept: LAB | Facility: CLINIC | Age: 55
End: 2020-12-19

## 2020-12-19 ENCOUNTER — LAB (OUTPATIENT)
Dept: LAB | Facility: CLINIC | Age: 55
End: 2020-12-19
Payer: COMMERCIAL

## 2020-12-19 DIAGNOSIS — Z00.00 ROUTINE GENERAL MEDICAL EXAMINATION AT A HEALTH CARE FACILITY: Primary | ICD-10-CM

## 2020-12-19 DIAGNOSIS — Z00.00 ROUTINE GENERAL MEDICAL EXAMINATION AT A HEALTH CARE FACILITY: ICD-10-CM

## 2020-12-19 LAB
ALBUMIN SERPL BCP-MCNC: 3.5 G/DL (ref 3.5–5)
ALP SERPL-CCNC: 67 U/L (ref 46–116)
ALT SERPL W P-5'-P-CCNC: 19 U/L (ref 12–78)
ANION GAP SERPL CALCULATED.3IONS-SCNC: 8 MMOL/L (ref 4–13)
AST SERPL W P-5'-P-CCNC: 16 U/L (ref 5–45)
BASOPHILS # BLD AUTO: 0.03 THOUSANDS/ΜL (ref 0–0.1)
BASOPHILS NFR BLD AUTO: 1 % (ref 0–1)
BILIRUB SERPL-MCNC: 0.48 MG/DL (ref 0.2–1)
BUN SERPL-MCNC: 17 MG/DL (ref 5–25)
CALCIUM SERPL-MCNC: 8.6 MG/DL (ref 8.3–10.1)
CHLORIDE SERPL-SCNC: 106 MMOL/L (ref 100–108)
CHOLEST SERPL-MCNC: 185 MG/DL (ref 50–200)
CO2 SERPL-SCNC: 28 MMOL/L (ref 21–32)
CREAT SERPL-MCNC: 0.9 MG/DL (ref 0.6–1.3)
EOSINOPHIL # BLD AUTO: 0.1 THOUSAND/ΜL (ref 0–0.61)
EOSINOPHIL NFR BLD AUTO: 3 % (ref 0–6)
ERYTHROCYTE [DISTWIDTH] IN BLOOD BY AUTOMATED COUNT: 12.5 % (ref 11.6–15.1)
GFR SERPL CREATININE-BSD FRML MDRD: 72 ML/MIN/1.73SQ M
GLUCOSE P FAST SERPL-MCNC: 82 MG/DL (ref 65–99)
HCT VFR BLD AUTO: 41.2 % (ref 34.8–46.1)
HDLC SERPL-MCNC: 65 MG/DL
HGB BLD-MCNC: 13.7 G/DL (ref 11.5–15.4)
IMM GRANULOCYTES # BLD AUTO: 0.01 THOUSAND/UL (ref 0–0.2)
IMM GRANULOCYTES NFR BLD AUTO: 0 % (ref 0–2)
LDLC SERPL CALC-MCNC: 108 MG/DL (ref 0–100)
LYMPHOCYTES # BLD AUTO: 0.79 THOUSANDS/ΜL (ref 0.6–4.47)
LYMPHOCYTES NFR BLD AUTO: 21 % (ref 14–44)
MCH RBC QN AUTO: 30.6 PG (ref 26.8–34.3)
MCHC RBC AUTO-ENTMCNC: 33.3 G/DL (ref 31.4–37.4)
MCV RBC AUTO: 92 FL (ref 82–98)
MONOCYTES # BLD AUTO: 0.27 THOUSAND/ΜL (ref 0.17–1.22)
MONOCYTES NFR BLD AUTO: 7 % (ref 4–12)
NEUTROPHILS # BLD AUTO: 2.5 THOUSANDS/ΜL (ref 1.85–7.62)
NEUTS SEG NFR BLD AUTO: 68 % (ref 43–75)
NONHDLC SERPL-MCNC: 120 MG/DL
NRBC BLD AUTO-RTO: 0 /100 WBCS
PLATELET # BLD AUTO: 161 THOUSANDS/UL (ref 149–390)
PMV BLD AUTO: 9.6 FL (ref 8.9–12.7)
POTASSIUM SERPL-SCNC: 3.8 MMOL/L (ref 3.5–5.3)
PROT SERPL-MCNC: 7 G/DL (ref 6.4–8.2)
RBC # BLD AUTO: 4.48 MILLION/UL (ref 3.81–5.12)
SODIUM SERPL-SCNC: 142 MMOL/L (ref 136–145)
TRIGL SERPL-MCNC: 61 MG/DL
TSH SERPL DL<=0.05 MIU/L-ACNC: 1.68 UIU/ML (ref 0.36–3.74)
WBC # BLD AUTO: 3.7 THOUSAND/UL (ref 4.31–10.16)

## 2020-12-19 PROCEDURE — 80053 COMPREHEN METABOLIC PANEL: CPT

## 2020-12-19 PROCEDURE — 80061 LIPID PANEL: CPT

## 2020-12-19 PROCEDURE — 36415 COLL VENOUS BLD VENIPUNCTURE: CPT

## 2020-12-19 PROCEDURE — 84443 ASSAY THYROID STIM HORMONE: CPT

## 2020-12-19 PROCEDURE — 85025 COMPLETE CBC W/AUTO DIFF WBC: CPT

## 2021-02-19 ENCOUNTER — TELEPHONE (OUTPATIENT)
Dept: OBGYN CLINIC | Facility: CLINIC | Age: 56
End: 2021-02-19

## 2021-02-19 NOTE — TELEPHONE ENCOUNTER
Pt called and stated she needed mammo script for upcoming appt  I told pt I would have that put in for her         **after getting off phone, I checked pt's chart and she already has a script in that doesn't  until   Pt does not need a new script

## 2021-02-27 ENCOUNTER — HOSPITAL ENCOUNTER (OUTPATIENT)
Dept: RADIOLOGY | Age: 56
Discharge: HOME | End: 2021-02-27
Payer: COMMERCIAL

## 2021-02-27 VITALS — WEIGHT: 176 LBS | BODY MASS INDEX: 33.23 KG/M2 | HEIGHT: 61 IN

## 2021-02-27 DIAGNOSIS — Z12.31 ENCOUNTER FOR SCREENING MAMMOGRAM FOR MALIGNANT NEOPLASM OF BREAST: ICD-10-CM

## 2021-02-27 PROCEDURE — 77067 SCR MAMMO BI INCL CAD: CPT

## 2021-02-27 PROCEDURE — 77063 BREAST TOMOSYNTHESIS BI: CPT

## 2021-03-24 ENCOUNTER — ANNUAL EXAM (OUTPATIENT)
Dept: OBGYN CLINIC | Facility: CLINIC | Age: 56
End: 2021-03-24
Payer: COMMERCIAL

## 2021-03-24 VITALS
HEIGHT: 61 IN | BODY MASS INDEX: 32.74 KG/M2 | DIASTOLIC BLOOD PRESSURE: 92 MMHG | WEIGHT: 173.4 LBS | SYSTOLIC BLOOD PRESSURE: 126 MMHG

## 2021-03-24 DIAGNOSIS — R68.82 DECREASED LIBIDO: ICD-10-CM

## 2021-03-24 DIAGNOSIS — Z01.419 WOMEN'S ANNUAL ROUTINE GYNECOLOGICAL EXAMINATION: Primary | ICD-10-CM

## 2021-03-24 DIAGNOSIS — Z12.11 SCREENING FOR COLON CANCER: ICD-10-CM

## 2021-03-24 DIAGNOSIS — N93.0 POSTCOITAL BLEEDING: ICD-10-CM

## 2021-03-24 PROBLEM — R39.15 URINARY URGENCY: Status: ACTIVE | Noted: 2021-03-24

## 2021-03-24 PROCEDURE — S0612 ANNUAL GYNECOLOGICAL EXAMINA: HCPCS | Performed by: STUDENT IN AN ORGANIZED HEALTH CARE EDUCATION/TRAINING PROGRAM

## 2021-03-24 NOTE — ASSESSMENT & PLAN NOTE
Single episode  No additional episodes of postmenopausal bleeding - notes was spotting only  Reviewed low threshold to get pelvic ultrasound to evaluate further - did not have done after episode last year  Will continue to monitor

## 2021-03-24 NOTE — ASSESSMENT & PLAN NOTE
Worsened secondary to pain with intercourse, dryness  Previously using [address], placement  Potential for premarin if this does not resolve issues

## 2021-03-24 NOTE — PROGRESS NOTES
Established patient annual exam - Gynecology    Chief complaint: annual exam    Chief Complaint   Patient presents with    Gynecologic Exam     annual exam    Last pap: 17-neg  Last mammo: 21-neg  Last colonoscopy: never  Assessment/Plan     63 y o  [id number] normal annual gynecologic examination  Problem List Items Addressed This Visit        Other    Decreased libido     Worsened secondary to pain with intercourse, dryness  Previously using [address] [place] [address] Street, placement  Potential for premarin if this does not resolve issues         Postcoital bleeding     Single episode  No additional episodes of postmenopausal bleeding - notes was spotting only  Reviewed low threshold to get pelvic ultrasound to evaluate further - did not have done after episode last year  Will continue to monitor           Other Visit Diagnoses     Women's annual routine gynecological examination    -  Primary    Screening for colon cancer        Relevant Orders    Ambulatory referral to Gastroenterology        Normal findings on routine gynecologic exam today  Reviewed annual screening mammogram and annual clinical breast exam     Discussed ASCCP guidelines and Pap smear with cotesting frequency, up to date today  STD/STI testing offered, declined today - reports low risk  Encouraged daily calcium and vitamin D intake was well as weight bearing exercise daily for bone health  Follow up PRN or for annual exam   --------------------------------------------------------  History of Present Illness     Richar Oneil is a 63 y o  female  No LMP recorded (lmp unknown)  Patient is postmenopausal  who presents for annual examination      Concerns today: routine checkup    New updates: no further postmenopausal bleeding - the time she did have was a small dot, also secondary to vaginal dryness    Health maintenance  Last pap smear: 2017 NILM / HPV neg  Bone density scan: n/a  Last mammogram: 2021 negative  Last colonoscopy: Not on file has not had  HPV vaccination?: no    GYN History  Menarche age [age]  No LMP recorded (lmp unknown)   Patient is postmenopausal    Approximately age 47    No regular periods  No history abnormal Pap smears  No history of cryotherapy, LEEP, or cold knife cone of the cervix    Sexually active? yes  Current sexual partner(s):   History of STD: no  Interested in STD screening today? no  Concerns about sex: as above    Occupation: for an     OB History    Para Term  AB Living   4 3 3   1     SAB TAB Ectopic Multiple Live Births   1       3      # Outcome Date GA Lbr Eliseo/2nd Weight Sex Delivery Anes PTL Lv   4 SAB            3 Term            2 Term            1 Term              # 1 - Date: None, Sex: None, Weight: None, GA: None, Delivery: None, Apgar1: None, Apgar5: None, Living: None, Birth Comments: None    # 2 - Date: None, Sex: None, Weight: None, GA: None, Delivery: None, Apgar1: None, Apgar5: None, Living: None, Birth Comments: None    # 3 - Date: None, Sex: None, Weight: None, GA: None, Delivery: None, Apgar1: None, Apgar5: None, Living: None, Birth Comments: None    # 4 - Date: None, Sex: None, Weight: None, GA: None, Delivery: None, Apgar1: None, Apgar5: None, Living: None, Birth Comments: None    Past Surgical History:   Procedure Laterality Date    BREAST EXCISIONAL BIOPSY Right     benign    DILATION AND CURETTAGE OF UTERUS      ENDOMETRIAL BIOPSY      GALLBLADDER SURGERY       Family History   Problem Relation Age of Onset    Colon cancer Maternal Grandmother 66    No Known Problems Mother     No Known Problems Father     No Known Problems Sister     No Known Problems Maternal Grandfather     No Known Problems Paternal Grandmother     No Known Problems Paternal Grandfather     No Known Problems Brother     No Known Problems Son     No Known Problems Son     No Known Problems Son     No Known Problems Maternal Aunt     No Known Problems Maternal Aunt      Social History   Social History     Substance and Sexual Activity   Alcohol Use Yes    Comment: occasional     Social History     Substance and Sexual Activity   Drug Use No     Social History     Tobacco Use   Smoking Status Former Smoker    Types: Cigarettes    Quit date: 200    Years since quittin 2   Smokeless Tobacco Never Used     No current outpatient medications on file  Allergies   Allergen Reactions    Penicillins Rash     REVIEW OF SYSTEMS  CONSTITUTIONAL:  No weight loss, fever, chills, weakness  HEENT: No visual loss, blurred vision  SKIN: No rash or itching  CARDIOVASCULAR: No chest pain, chest pressure, or chest discomfort  RESPIRATORY: No shortness of breath, cough or sputum  GASTROINTESTINAL: No nausea, emesis, or diarrhea  NEUROLOGICAL: No headache, dizziness, syncope, paralysis  MUSCUOSKELETAL: No muscle, back pain, joint stiffness or bruising  INFECTIOUS: No fever, chills  PSYCHIATRIC: No disorder of thought or mood  HEMATOLOGIC: No easy bruising or bleeding  GYN: No postmenopausal bleeding  No involuntary urine loss  Some pain with intercourse due to vaginal dryness    Objective   Vitals: Blood pressure 126/92, height 5' 1" (1 549 m), weight 78 7 kg (173 lb 6 4 oz), not currently breastfeeding  Body mass index is 32 76 kg/m²  General: NAD, AAOx3  Heart: RRR  Lungs: CTAB  Neck: supple, no thyromegaly or thyroid nodules appreciated  Breast: nipples everted bilaterally, no skin changes  No dimpling, redness, or erythema  No breast masses or axillary masses bilaterally  Fibrocystic breast changes  Abdomen: soft, non-distended, non tender to palpation  Extremities: non-tender to palpation  Speculum exam: Normal appearing external genitalia, normal hair distribution  Urethra well-suspended, no clitoromegaly noted  Vagina pale minimal rugation  scant discharge noted  Cervix without lesion, parous appearing   no blood in vaginal vault    Pelvic exam: no cervical motion tenderness  No adnexal masses or tenderness  anteverted uterus, normal size  Lab Results:   No visits with results within 1 Day(s) from this visit     Latest known visit with results is:   Lab on 12/19/2020   Component Date Value    WBC 12/19/2020 3 70*    RBC 12/19/2020 4 48     Hemoglobin 12/19/2020 13 7     Hematocrit 12/19/2020 41 2     MCV 12/19/2020 92     MCH 12/19/2020 30 6     MCHC 12/19/2020 33 3     RDW 12/19/2020 12 5     MPV 12/19/2020 9 6     Platelets 81/45/7488 161     nRBC 12/19/2020 0     Neutrophils Relative 12/19/2020 68     Immat GRANS % 12/19/2020 0     Lymphocytes Relative 12/19/2020 21     Monocytes Relative 12/19/2020 7     Eosinophils Relative 12/19/2020 3     Basophils Relative 12/19/2020 1     Neutrophils Absolute 12/19/2020 2 50     Immature Grans Absolute 12/19/2020 0 01     Lymphocytes Absolute 12/19/2020 0 79     Monocytes Absolute 12/19/2020 0 27     Eosinophils Absolute 12/19/2020 0 10     Basophils Absolute 12/19/2020 0 03     Sodium 12/19/2020 142     Potassium 12/19/2020 3 8     Chloride 12/19/2020 106     CO2 12/19/2020 28     ANION GAP 12/19/2020 8     BUN 12/19/2020 17     Creatinine 12/19/2020 0 90     Glucose, Fasting 12/19/2020 82     Calcium 12/19/2020 8 6     AST 12/19/2020 16     ALT 12/19/2020 19     Alkaline Phosphatase 12/19/2020 67     Total Protein 12/19/2020 7 0     Albumin 12/19/2020 3 5     Total Bilirubin 12/19/2020 0 48     eGFR 12/19/2020 72     Cholesterol 12/19/2020 185     Triglycerides 12/19/2020 61     HDL, Direct 12/19/2020 65     LDL Calculated 12/19/2020 108*    Non-HDL-Chol (CHOL-HDL) 12/19/2020 120     TSH 3RD GENERATON 12/19/2020 1 677

## 2021-04-16 DIAGNOSIS — Z23 ENCOUNTER FOR IMMUNIZATION: ICD-10-CM

## 2021-05-05 ENCOUNTER — IMMUNIZATIONS (OUTPATIENT)
Dept: FAMILY MEDICINE CLINIC | Facility: HOSPITAL | Age: 56
End: 2021-05-05

## 2021-05-05 DIAGNOSIS — Z23 ENCOUNTER FOR IMMUNIZATION: Primary | ICD-10-CM

## 2021-05-05 PROCEDURE — 0001A SARS-COV-2 / COVID-19 MRNA VACCINE (PFIZER-BIONTECH) 30 MCG: CPT

## 2021-05-05 PROCEDURE — 91300 SARS-COV-2 / COVID-19 MRNA VACCINE (PFIZER-BIONTECH) 30 MCG: CPT

## 2021-05-27 ENCOUNTER — IMMUNIZATIONS (OUTPATIENT)
Dept: FAMILY MEDICINE CLINIC | Facility: HOSPITAL | Age: 56
End: 2021-05-27

## 2021-05-27 DIAGNOSIS — Z23 ENCOUNTER FOR IMMUNIZATION: Primary | ICD-10-CM

## 2021-05-27 PROCEDURE — 0002A SARS-COV-2 / COVID-19 MRNA VACCINE (PFIZER-BIONTECH) 30 MCG: CPT | Performed by: PHYSICIAN ASSISTANT

## 2021-05-27 PROCEDURE — 91300 SARS-COV-2 / COVID-19 MRNA VACCINE (PFIZER-BIONTECH) 30 MCG: CPT | Performed by: PHYSICIAN ASSISTANT

## 2021-10-09 ENCOUNTER — APPOINTMENT (OUTPATIENT)
Dept: LAB | Facility: CLINIC | Age: 56
End: 2021-10-09
Payer: COMMERCIAL

## 2021-10-09 DIAGNOSIS — J30.9 SPASMODIC RHINORRHEA: ICD-10-CM

## 2021-10-09 DIAGNOSIS — I10 ESSENTIAL HYPERTENSION, MALIGNANT: ICD-10-CM

## 2021-10-09 LAB
ALBUMIN SERPL BCP-MCNC: 3.5 G/DL (ref 3.5–5)
ALP SERPL-CCNC: 74 U/L (ref 46–116)
ALT SERPL W P-5'-P-CCNC: 19 U/L (ref 12–78)
ANION GAP SERPL CALCULATED.3IONS-SCNC: 8 MMOL/L (ref 4–13)
AST SERPL W P-5'-P-CCNC: 15 U/L (ref 5–45)
BASOPHILS # BLD AUTO: 0.02 THOUSANDS/ΜL (ref 0–0.1)
BASOPHILS NFR BLD AUTO: 1 % (ref 0–1)
BILIRUB SERPL-MCNC: 0.46 MG/DL (ref 0.2–1)
BUN SERPL-MCNC: 15 MG/DL (ref 5–25)
CALCIUM SERPL-MCNC: 8.8 MG/DL (ref 8.3–10.1)
CHLORIDE SERPL-SCNC: 106 MMOL/L (ref 100–108)
CHOLEST SERPL-MCNC: 163 MG/DL (ref 50–200)
CO2 SERPL-SCNC: 28 MMOL/L (ref 21–32)
CREAT SERPL-MCNC: 0.92 MG/DL (ref 0.6–1.3)
EOSINOPHIL # BLD AUTO: 0.11 THOUSAND/ΜL (ref 0–0.61)
EOSINOPHIL NFR BLD AUTO: 3 % (ref 0–6)
ERYTHROCYTE [DISTWIDTH] IN BLOOD BY AUTOMATED COUNT: 12.4 % (ref 11.6–15.1)
GFR SERPL CREATININE-BSD FRML MDRD: 69 ML/MIN/1.73SQ M
GLUCOSE P FAST SERPL-MCNC: 81 MG/DL (ref 65–99)
HCT VFR BLD AUTO: 40 % (ref 34.8–46.1)
HDLC SERPL-MCNC: 57 MG/DL
HGB BLD-MCNC: 13 G/DL (ref 11.5–15.4)
IMM GRANULOCYTES # BLD AUTO: 0.01 THOUSAND/UL (ref 0–0.2)
IMM GRANULOCYTES NFR BLD AUTO: 0 % (ref 0–2)
LDLC SERPL CALC-MCNC: 93 MG/DL (ref 0–100)
LYMPHOCYTES # BLD AUTO: 0.93 THOUSANDS/ΜL (ref 0.6–4.47)
LYMPHOCYTES NFR BLD AUTO: 24 % (ref 14–44)
MCH RBC QN AUTO: 30.2 PG (ref 26.8–34.3)
MCHC RBC AUTO-ENTMCNC: 32.5 G/DL (ref 31.4–37.4)
MCV RBC AUTO: 93 FL (ref 82–98)
MONOCYTES # BLD AUTO: 0.27 THOUSAND/ΜL (ref 0.17–1.22)
MONOCYTES NFR BLD AUTO: 7 % (ref 4–12)
NEUTROPHILS # BLD AUTO: 2.6 THOUSANDS/ΜL (ref 1.85–7.62)
NEUTS SEG NFR BLD AUTO: 65 % (ref 43–75)
NONHDLC SERPL-MCNC: 106 MG/DL
NRBC BLD AUTO-RTO: 0 /100 WBCS
PLATELET # BLD AUTO: 178 THOUSANDS/UL (ref 149–390)
PMV BLD AUTO: 9.5 FL (ref 8.9–12.7)
POTASSIUM SERPL-SCNC: 3.9 MMOL/L (ref 3.5–5.3)
PROT SERPL-MCNC: 7 G/DL (ref 6.4–8.2)
RBC # BLD AUTO: 4.31 MILLION/UL (ref 3.81–5.12)
SODIUM SERPL-SCNC: 142 MMOL/L (ref 136–145)
T4 FREE SERPL-MCNC: 0.99 NG/DL (ref 0.76–1.46)
TRIGL SERPL-MCNC: 63 MG/DL
TSH SERPL DL<=0.05 MIU/L-ACNC: 2.39 UIU/ML (ref 0.36–3.74)
WBC # BLD AUTO: 3.94 THOUSAND/UL (ref 4.31–10.16)

## 2021-10-09 PROCEDURE — 80053 COMPREHEN METABOLIC PANEL: CPT

## 2021-10-09 PROCEDURE — 85025 COMPLETE CBC W/AUTO DIFF WBC: CPT

## 2021-10-09 PROCEDURE — 80061 LIPID PANEL: CPT

## 2021-10-09 PROCEDURE — 36415 COLL VENOUS BLD VENIPUNCTURE: CPT

## 2021-10-09 PROCEDURE — 84439 ASSAY OF FREE THYROXINE: CPT

## 2021-10-09 PROCEDURE — 84443 ASSAY THYROID STIM HORMONE: CPT

## 2021-10-09 PROCEDURE — 86787 VARICELLA-ZOSTER ANTIBODY: CPT

## 2021-10-11 LAB — VZV IGG SER IA-ACNC: NORMAL

## 2022-03-05 ENCOUNTER — HOSPITAL ENCOUNTER (OUTPATIENT)
Dept: RADIOLOGY | Age: 57
Discharge: HOME | End: 2022-03-05
Payer: COMMERCIAL

## 2022-03-05 VITALS — HEIGHT: 61 IN | BODY MASS INDEX: 33.23 KG/M2 | WEIGHT: 176 LBS

## 2022-03-05 DIAGNOSIS — Z12.31 ENCOUNTER FOR SCREENING MAMMOGRAM FOR MALIGNANT NEOPLASM OF BREAST: ICD-10-CM

## 2022-03-05 PROCEDURE — 77063 BREAST TOMOSYNTHESIS BI: CPT

## 2022-03-05 PROCEDURE — 77067 SCR MAMMO BI INCL CAD: CPT

## 2022-04-18 ENCOUNTER — ANNUAL EXAM (OUTPATIENT)
Dept: OBGYN CLINIC | Facility: CLINIC | Age: 57
End: 2022-04-18
Payer: COMMERCIAL

## 2022-04-18 VITALS
WEIGHT: 176.2 LBS | HEIGHT: 61 IN | DIASTOLIC BLOOD PRESSURE: 90 MMHG | BODY MASS INDEX: 33.27 KG/M2 | SYSTOLIC BLOOD PRESSURE: 142 MMHG

## 2022-04-18 DIAGNOSIS — N93.0 PCB (POST COITAL BLEEDING): ICD-10-CM

## 2022-04-18 DIAGNOSIS — N95.0 POST-MENOPAUSAL BLEEDING: ICD-10-CM

## 2022-04-18 DIAGNOSIS — Z12.31 ENCOUNTER FOR SCREENING MAMMOGRAM FOR BREAST CANCER: ICD-10-CM

## 2022-04-18 DIAGNOSIS — Z01.419 ENCOUNTER FOR GYNECOLOGICAL EXAMINATION (GENERAL) (ROUTINE) WITHOUT ABNORMAL FINDINGS: Primary | ICD-10-CM

## 2022-04-18 DIAGNOSIS — Z12.11 SCREENING FOR COLON CANCER: ICD-10-CM

## 2022-04-18 PROCEDURE — G0476 HPV COMBO ASSAY CA SCREEN: HCPCS | Performed by: OBSTETRICS & GYNECOLOGY

## 2022-04-18 PROCEDURE — G0145 SCR C/V CYTO,THINLAYER,RESCR: HCPCS | Performed by: OBSTETRICS & GYNECOLOGY

## 2022-04-18 PROCEDURE — S0612 ANNUAL GYNECOLOGICAL EXAMINA: HCPCS | Performed by: OBSTETRICS & GYNECOLOGY

## 2022-04-18 NOTE — PROGRESS NOTES
Patient's Lab: [facility]    Last Yearly: 3/24/21  Last Pap: 12/5/17  Results: -/-  Last Mammo Date: 3/5/22  Birads: 1-  Lifetime Risk Assessment: 8 22%  Next Mammo Scheduled: No  Script Needed: Yes  Colon: Never  Postmenopausal  Any VB: Yes, Post-Coital everytime  S/P: Covid/Flu  Sexually Active: Yes        Family hx of breast cancer: No  Family hx of ovarian cancer: No  Family hx of colon cancer:  MGM    Q's/Concerns: None

## 2022-04-18 NOTE — PROGRESS NOTES
Sadia Barkley   1965    CC:  Yearly exam    S:  61 y o  female here for yearly exam  She is postmenopausal    She denies vaginal discharge, itching, odor or dryness  Sexual activity: She is sexually active  - does have bleeding with every sexual act  Some difficulty with activity related to husbands size  This is not a new problem, but has not yet been worked up  Monogamous with  for many years  She has no major urinary concerns, bowel problems, breast concerns  Last Pap: 12/5/17 - Normal Cytology, Negative HPV  Last Mammo: 3/5/22 - BiRad 1  Last Colonoscopy:  None    We reviewed ASCCP guidelines for Pap testing  Family hx of breast cancer: no  Family hx of ovarian cancer: no  Family hx of colon cancer: MGM    No current outpatient medications on file  Patient Active Problem List   Diagnosis    Encounter for gynecological examination (general) (routine) without abnormal findings    Screening for malignant neoplasm of breast    Urinary urgency    Decreased libido    Postcoital bleeding     Family History   Problem Relation Age of Onset    Colon cancer Maternal Grandmother 66    No Known Problems Mother     No Known Problems Father     No Known Problems Sister     No Known Problems Maternal Grandfather     No Known Problems Paternal Grandmother     No Known Problems Paternal Grandfather     No Known Problems Brother     No Known Problems Son     No Known Problems Son     No Known Problems Son     No Known Problems Maternal Aunt     No Known Problems Maternal Aunt      Past Medical History:   Diagnosis Date    H/O amenorrhea     H/O menorrhagia     History of IBS     Hx of acne     Hypertension         Review of Systems   Respiratory: Negative  Cardiovascular: Negative  Gastrointestinal: Negative for constipation and diarrhea  Genitourinary: Negative for difficulty urinating, pelvic pain, vaginal bleeding, vaginal discharge, itching or odor      O:  Blood pressure 142/90, height 5' 1" (1 549 m), weight 79 9 kg (176 lb 3 2 oz), not currently breastfeeding  Patient appears well and is not in distress  Breasts are symmetrical without mass, tenderness, nipple discharge, skin changes or adenopathy  Abdomen is soft and nontender without masses  External genitals are normal without lesions or rashes  Urethral meatus and urethra are normal  Bladder is normal to palpation  Vagina is normal without discharge or bleeding, generalized atrophic changes present   Cervix is normal without discharge or lesion  Uterus is normal, mobile, nontender without palpable mass  Adnexa are normal, nontender, without palpable mass  A:  Yearly exam, Post Coital Bleeding  P:   We discussed the importance of a workup of her bleeding  Could be related to trauma with activity/atrophy, but we are obligated to rule out cervical and uterine source  Pap is due today - this was collected  Discussed importance of a pelvic US - to evaluate the uterine lining to determine need for biopsy or not  Discussed that uterine cancer can present with bleeding which is why this workup is important  She is in agreement with plan and will schedule US  Has not yet had colon cancer screening - but does plan to have colonoscopy this year  Mammo ordered  RTO one year for yearly exam or sooner as needed

## 2022-04-22 LAB
HPV HR 12 DNA CVX QL NAA+PROBE: NEGATIVE
HPV16 DNA CVX QL NAA+PROBE: NEGATIVE
HPV18 DNA CVX QL NAA+PROBE: NEGATIVE
LAB AP GYN PRIMARY INTERPRETATION: NORMAL
Lab: NORMAL

## 2022-09-27 ENCOUNTER — PREP FOR PROCEDURE (OUTPATIENT)
Dept: GASTROENTEROLOGY | Facility: AMBULARY SURGERY CENTER | Age: 57
End: 2022-09-27

## 2022-09-27 ENCOUNTER — TELEPHONE (OUTPATIENT)
Dept: GASTROENTEROLOGY | Facility: AMBULARY SURGERY CENTER | Age: 57
End: 2022-09-27

## 2022-09-27 DIAGNOSIS — Z12.11 SPECIAL SCREENING FOR MALIGNANT NEOPLASMS, COLON: Primary | ICD-10-CM

## 2022-09-27 NOTE — TELEPHONE ENCOUNTER
Patient is scheduled for colonoscopy on December 19 , 2022 at [facility]  with Blanca Ko MD  Patient is aware of pre-procedure prep of Miralax/Dulcolax and they will be called the day prior between 2 and 6 pm for time to report for procedure  Pre-procedure prep has been given to the patient  via [address] mail and e-mail on September 27 , 2022

## 2023-01-21 ENCOUNTER — APPOINTMENT (OUTPATIENT)
Dept: LAB | Facility: CLINIC | Age: 58
End: 2023-01-21

## 2023-01-21 DIAGNOSIS — Z00.00 ROUTINE GENERAL MEDICAL EXAMINATION AT A HEALTH CARE FACILITY: ICD-10-CM

## 2023-01-21 LAB
ALBUMIN SERPL BCP-MCNC: 3.8 G/DL (ref 3.5–5)
ALP SERPL-CCNC: 62 U/L (ref 34–104)
ALT SERPL W P-5'-P-CCNC: 12 U/L (ref 7–52)
ANION GAP SERPL CALCULATED.3IONS-SCNC: 4 MMOL/L (ref 4–13)
AST SERPL W P-5'-P-CCNC: 16 U/L (ref 13–39)
BASOPHILS # BLD AUTO: 0.03 THOUSANDS/ÂΜL (ref 0–0.1)
BASOPHILS NFR BLD AUTO: 1 % (ref 0–1)
BILIRUB SERPL-MCNC: 0.45 MG/DL (ref 0.2–1)
BUN SERPL-MCNC: 15 MG/DL (ref 5–25)
CALCIUM SERPL-MCNC: 8.7 MG/DL (ref 8.4–10.2)
CHLORIDE SERPL-SCNC: 108 MMOL/L (ref 96–108)
CHOLEST SERPL-MCNC: 172 MG/DL
CO2 SERPL-SCNC: 28 MMOL/L (ref 21–32)
CREAT SERPL-MCNC: 0.89 MG/DL (ref 0.6–1.3)
EOSINOPHIL # BLD AUTO: 0.1 THOUSAND/ÂΜL (ref 0–0.61)
EOSINOPHIL NFR BLD AUTO: 3 % (ref 0–6)
ERYTHROCYTE [DISTWIDTH] IN BLOOD BY AUTOMATED COUNT: 12.3 % (ref 11.6–15.1)
GFR SERPL CREATININE-BSD FRML MDRD: 71 ML/MIN/1.73SQ M
GLUCOSE P FAST SERPL-MCNC: 91 MG/DL (ref 65–99)
HCT VFR BLD AUTO: 39.2 % (ref 34.8–46.1)
HDLC SERPL-MCNC: 56 MG/DL
HGB BLD-MCNC: 12.7 G/DL (ref 11.5–15.4)
IMM GRANULOCYTES # BLD AUTO: 0.01 THOUSAND/UL (ref 0–0.2)
IMM GRANULOCYTES NFR BLD AUTO: 0 % (ref 0–2)
LDLC SERPL CALC-MCNC: 101 MG/DL (ref 0–100)
LYMPHOCYTES # BLD AUTO: 0.71 THOUSANDS/ÂΜL (ref 0.6–4.47)
LYMPHOCYTES NFR BLD AUTO: 19 % (ref 14–44)
MCH RBC QN AUTO: 29.8 PG (ref 26.8–34.3)
MCHC RBC AUTO-ENTMCNC: 32.4 G/DL (ref 31.4–37.4)
MCV RBC AUTO: 92 FL (ref 82–98)
MONOCYTES # BLD AUTO: 0.25 THOUSAND/ÂΜL (ref 0.17–1.22)
MONOCYTES NFR BLD AUTO: 7 % (ref 4–12)
NEUTROPHILS # BLD AUTO: 2.64 THOUSANDS/ÂΜL (ref 1.85–7.62)
NEUTS SEG NFR BLD AUTO: 70 % (ref 43–75)
NONHDLC SERPL-MCNC: 116 MG/DL
NRBC BLD AUTO-RTO: 0 /100 WBCS
PLATELET # BLD AUTO: 169 THOUSANDS/UL (ref 149–390)
PMV BLD AUTO: 9.6 FL (ref 8.9–12.7)
POTASSIUM SERPL-SCNC: 4.1 MMOL/L (ref 3.5–5.3)
PROT SERPL-MCNC: 6.7 G/DL (ref 6.4–8.4)
RBC # BLD AUTO: 4.26 MILLION/UL (ref 3.81–5.12)
SODIUM SERPL-SCNC: 140 MMOL/L (ref 135–147)
T4 FREE SERPL-MCNC: 0.95 NG/DL (ref 0.76–1.46)
TRIGL SERPL-MCNC: 73 MG/DL
TSH SERPL DL<=0.05 MIU/L-ACNC: 1.77 UIU/ML (ref 0.45–4.5)
WBC # BLD AUTO: 3.74 THOUSAND/UL (ref 4.31–10.16)

## 2023-02-02 ENCOUNTER — TELEPHONE (OUTPATIENT)
Dept: GASTROENTEROLOGY | Facility: AMBULARY SURGERY CENTER | Age: 58
End: 2023-02-02

## 2023-02-02 NOTE — TELEPHONE ENCOUNTER
Spoke w/ pt/pt states "she is all set" -pt has prep and instructions/pt is scheduled for 2/6/2023 w/ dr Michelle Oconnor at [facility]

## 2023-02-06 ENCOUNTER — HOSPITAL ENCOUNTER (OUTPATIENT)
Dept: GASTROENTEROLOGY | Facility: AMBULARY SURGERY CENTER | Age: 58
Setting detail: OUTPATIENT SURGERY
Discharge: HOME | End: 2023-02-06
Attending: INTERNAL MEDICINE

## 2023-02-06 ENCOUNTER — ANESTHESIA (OUTPATIENT)
Dept: GASTROENTEROLOGY | Facility: AMBULARY SURGERY CENTER | Age: 58
End: 2023-02-06

## 2023-02-06 ENCOUNTER — ANESTHESIA EVENT (OUTPATIENT)
Dept: GASTROENTEROLOGY | Facility: AMBULARY SURGERY CENTER | Age: 58
End: 2023-02-06

## 2023-02-06 VITALS
HEART RATE: 69 BPM | DIASTOLIC BLOOD PRESSURE: 73 MMHG | TEMPERATURE: 97 F | RESPIRATION RATE: 17 BRPM | OXYGEN SATURATION: 100 % | WEIGHT: 175 LBS | SYSTOLIC BLOOD PRESSURE: 131 MMHG | BODY MASS INDEX: 33.04 KG/M2 | HEIGHT: 61 IN

## 2023-02-06 DIAGNOSIS — Z12.11 SPECIAL SCREENING FOR MALIGNANT NEOPLASMS, COLON: ICD-10-CM

## 2023-02-06 RX ORDER — SIMETHICONE 20 MG/.3ML
EMULSION ORAL AS NEEDED
Status: COMPLETED | OUTPATIENT
Start: 2023-02-06 | End: 2023-02-06

## 2023-02-06 RX ORDER — LOSARTAN POTASSIUM 50 MG/1
1 TABLET ORAL DAILY
COMMUNITY
Start: 2023-01-09

## 2023-02-06 RX ORDER — MULTIVITAMIN WITH IRON
TABLET ORAL DAILY
COMMUNITY
Start: 2023-01-06

## 2023-02-06 RX ORDER — LIDOCAINE HYDROCHLORIDE 10 MG/ML
0.5 INJECTION, SOLUTION EPIDURAL; INFILTRATION; INTRACAUDAL; PERINEURAL ONCE AS NEEDED
Status: DISCONTINUED | OUTPATIENT
Start: 2023-02-06 | End: 2023-02-10 | Stop reason: HOSPADM

## 2023-02-06 RX ORDER — PROPOFOL 10 MG/ML
INJECTION, EMULSION INTRAVENOUS AS NEEDED
Status: DISCONTINUED | OUTPATIENT
Start: 2023-02-06 | End: 2023-02-06

## 2023-02-06 RX ORDER — FLUTICASONE PROPIONATE 50 MCG
2 SPRAY, SUSPENSION (ML) NASAL DAILY
COMMUNITY
Start: 2021-04-19

## 2023-02-06 RX ORDER — SODIUM CHLORIDE, SODIUM LACTATE, POTASSIUM CHLORIDE, CALCIUM CHLORIDE 600; 310; 30; 20 MG/100ML; MG/100ML; MG/100ML; MG/100ML
125 INJECTION, SOLUTION INTRAVENOUS CONTINUOUS
Status: DISCONTINUED | OUTPATIENT
Start: 2023-02-06 | End: 2023-02-10 | Stop reason: HOSPADM

## 2023-02-06 RX ORDER — SODIUM CHLORIDE, SODIUM LACTATE, POTASSIUM CHLORIDE, CALCIUM CHLORIDE 600; 310; 30; 20 MG/100ML; MG/100ML; MG/100ML; MG/100ML
125 INJECTION, SOLUTION INTRAVENOUS CONTINUOUS
Status: CANCELLED | OUTPATIENT
Start: 2023-02-06

## 2023-02-06 RX ORDER — PROPOFOL 10 MG/ML
INJECTION, EMULSION INTRAVENOUS CONTINUOUS PRN
Status: DISCONTINUED | OUTPATIENT
Start: 2023-02-06 | End: 2023-02-06

## 2023-02-06 RX ORDER — LIDOCAINE HYDROCHLORIDE 10 MG/ML
INJECTION, SOLUTION EPIDURAL; INFILTRATION; INTRACAUDAL; PERINEURAL AS NEEDED
Status: DISCONTINUED | OUTPATIENT
Start: 2023-02-06 | End: 2023-02-06

## 2023-02-06 RX ORDER — LIDOCAINE HYDROCHLORIDE 10 MG/ML
0.5 INJECTION, SOLUTION EPIDURAL; INFILTRATION; INTRACAUDAL; PERINEURAL ONCE AS NEEDED
Status: CANCELLED | OUTPATIENT
Start: 2023-02-06

## 2023-02-06 RX ADMIN — PROPOFOL 100 MG: 10 INJECTION, EMULSION INTRAVENOUS at 12:49

## 2023-02-06 RX ADMIN — SODIUM CHLORIDE, SODIUM LACTATE, POTASSIUM CHLORIDE, AND CALCIUM CHLORIDE: .6; .31; .03; .02 INJECTION, SOLUTION INTRAVENOUS at 12:35

## 2023-02-06 RX ADMIN — LIDOCAINE HYDROCHLORIDE 50 MG: 10 INJECTION, SOLUTION EPIDURAL; INFILTRATION; INTRACAUDAL; PERINEURAL at 12:49

## 2023-02-06 RX ADMIN — PROPOFOL 120 MCG/KG/MIN: 10 INJECTION, EMULSION INTRAVENOUS at 12:51

## 2023-02-06 RX ADMIN — Medication 40 MG: at 12:53

## 2023-02-06 NOTE — ANESTHESIA PREPROCEDURE EVALUATION
Procedure:  COLONOSCOPY    Relevant Problems   No relevant active problems        Physical Exam    Airway    Mallampati score: II  TM Distance: >3 FB  Neck ROM: full     Dental   No notable dental hx     Cardiovascular      Pulmonary      Other Findings        Anesthesia Plan  ASA Score- 2     Anesthesia Type- IV sedation with anesthesia with ASA Monitors  Additional Monitors:   Airway Plan:     Comment: I have seen the patient and reviewed the history  Patient to receive IV sedation with full ASA monitors  Risks discussed with the patient, consent signed          Plan Factors-Exercise tolerance (METS): >4 METS  Chart reviewed  Patient summary reviewed  Induction- intravenous  Postoperative Plan-     Informed Consent- Anesthetic plan and risks discussed with patient  I personally reviewed this patient with the CRNA  Discussed and agreed on the Anesthesia Plan with the CRNA  Sorin Coello

## 2023-02-06 NOTE — H&P
History and Physical -  [facility]  Lety Paulino 61 y o  female MRN: [id number]        HPI: 59-year-old female with history of hypertension was referred for screening colonoscopy  Patient never had a colonoscopy in the past   Regular bowel movements  Historical Information   Past Medical History:   Diagnosis Date   • H/O amenorrhea    • H/O menorrhagia    • History of IBS    • Hx of acne    • Hypertension      Past Surgical History:   Procedure Laterality Date   • BREAST EXCISIONAL BIOPSY Right     benign   • DILATION AND CURETTAGE OF UTERUS     • ENDOMETRIAL BIOPSY     • GALLBLADDER SURGERY       Social History   Social History     Substance and Sexual Activity   Alcohol Use Yes    Comment: occasional     Social History     Substance and Sexual Activity   Drug Use No     Social History     Tobacco Use   Smoking Status Former   • Types: Cigarettes   • Quit date:    • Years since quittin 1   Smokeless Tobacco Never     Family History   Problem Relation Age of Onset   • Colon cancer Maternal Grandmother 78   • No Known Problems Mother    • No Known Problems Father    • No Known Problems Sister    • No Known Problems Maternal Grandfather    • No Known Problems Paternal Grandmother    • No Known Problems Paternal Grandfather    • No Known Problems Brother    • No Known Problems Son    • No Known Problems Son    • No Known Problems Son    • No Known Problems Maternal Aunt    • No Known Problems Maternal Aunt        Meds/Allergies     (Not in a hospital admission)      Allergies   Allergen Reactions   • Penicillins Rash       Objective     not currently breastfeeding      PHYSICAL EXAM:    Gen: NAD  CV: S1 & S2 normal, RRR  CHEST: Clear to auscultate  ABD: soft, NT/ND, good bowel sounds  EXT: no edema    ASSESSMENT:     Screening for colon cancer    PLAN:    Colonoscopy

## 2023-02-06 NOTE — ANESTHESIA POSTPROCEDURE EVALUATION
Post-Op Assessment Note    CV Status:  Stable  Pain Score: 0    Pain management: adequate     Mental Status:  Alert and awake   Hydration Status:  Euvolemic   PONV Controlled:  Controlled   Airway Patency:  Patent      Post Op Vitals Reviewed: Yes      Staff: CRNA, Anesthesiologist         No notable events documented      /68 (02/06/23 1306)    Temp (!) 97 °F (36 1 °C) (02/06/23 1306)    Pulse 72 (02/06/23 1306)   Resp 16 (02/06/23 1306)    SpO2 97 % (02/06/23 1306)

## 2023-06-07 ENCOUNTER — ANNUAL EXAM (OUTPATIENT)
Dept: OBGYN CLINIC | Facility: CLINIC | Age: 58
End: 2023-06-07
Payer: COMMERCIAL

## 2023-06-07 VITALS
SYSTOLIC BLOOD PRESSURE: 124 MMHG | BODY MASS INDEX: 34.66 KG/M2 | DIASTOLIC BLOOD PRESSURE: 96 MMHG | WEIGHT: 183.6 LBS | HEIGHT: 61 IN

## 2023-06-07 DIAGNOSIS — Z12.31 ENCOUNTER FOR SCREENING MAMMOGRAM FOR MALIGNANT NEOPLASM OF BREAST: ICD-10-CM

## 2023-06-07 DIAGNOSIS — N95.0 POST-MENOPAUSAL BLEEDING: ICD-10-CM

## 2023-06-07 DIAGNOSIS — Z01.419 ROUTINE GYNECOLOGICAL EXAMINATION: Primary | ICD-10-CM

## 2023-06-07 DIAGNOSIS — N93.0 POSTCOITAL BLEEDING: ICD-10-CM

## 2023-06-07 PROCEDURE — S0612 ANNUAL GYNECOLOGICAL EXAMINA: HCPCS | Performed by: PHYSICIAN ASSISTANT

## 2023-06-07 NOTE — PATIENT INSTRUCTIONS
Vaginal moisturizers (prevention)     Coconut oil (organic, pure, unscented) as needed for moisture or lubrication  Revaree hyaluronic acid suppository   Replens moisture restore external comfort gel daily ( use as directed on the box)    Replens long lasting vaginal moisturizer  ( use as directed on the box)       Vaginal Lubricants (during intercourse):     Coconut oil (Do not use if allergic)          Silicone based lubricant:  Uber Lube  AstroGlide  Replens silky smooth lubricant, premium silicone based lubricant for intercourse   ( use as directed, a small amount will provide an enhanced natural feeling)

## 2023-06-07 NOTE — PROGRESS NOTES
Assessment   60 y o  postmenopausal female presenting for annual exam      Plan   Diagnoses and all orders for this visit:    Routine gynecological examination    Normal findings on routine exam   Encouraged 150 min of exercise per week  Reviewed balanced diet  Multivitamin encouraged   Breast awareness/SBE encouraged     Encounter for screening mammogram for malignant neoplasm of breast  -     Mammo screening bilateral w 3d & cad; Future    Postcoital bleeding  -     US pelvis complete w transvaginal; Future     We discussed the importance of a workup of her bleeding  Reveiwed this could be related to trauma from sexual activity/atrophy, but need to rule out underlying cervical and uterine source  As bleeding has not changed from 2021 and she had NILM/hpv neg pap in 2022, this was deferred today  Discussed importance of a pelvic US to evaluate the uterine lining to rule out concerning abnormalities, I e  cancer  She is agreeable to schedule  Has previously declined estrace therapy for dryness  Reviewed OTC therapies  Post-menopausal bleeding        Pap - due 2027  Mammo slip given   Colonoscopy due 2028        RTO one year for yearly exam or sooner as needed  __________________________________________________________________      Subjective     Natalia Maradiaga is a 60 y o  postmenopausal female presenting for annual exam  She is without complaint and does not want STD testing today  SCREENING  Last Pap: 04/18/2022 NILM/hpv neg   Last Mammo: 03/05/2022 BIRADS 1 - Negative  Last Colonoscopy: 02/06/2023 5 year recall  Last DEXA: n/a     GYN  Denies postmenopausal vaginal bleeding, dryness, vaginal discharge, itching, odor, pelvic pain and vulvar/vaginal symptoms    Sexually active: Yes - single partner -   Sexual dysfunction: Reports PCB - with most sexual contact since 2021  Dryness and related discomfort managed with Uberlube        Hx Abnormal pap: denies  We reviewed [facility] guidelines for Pap testing today  OB  [id number]      Complaints: urinary urgency and frequency x 2 days  Declines urine culture for evaluation noting it is very mild and may be related to water intake  She will call if persistent or worsening  Denies leakage/difficulty urinating, dysuria, hematuria,       BREAST  Complaints: denies  Denies: breast lump, breast tenderness, dryness, nipple discharge, pruritus, skin color change, and skin lesion(s)  Personal hx: breast biopsy      GENERAL  PMH reviewed/updated and is as below  Patient does follow with a PCP        Pertinent Family Hx:   Family hx of breast cancer: no  Family hx of ovarian cancer: no  Family hx of colon cancer: MGM      Past Medical History:   Diagnosis Date   • H/O amenorrhea    • H/O menorrhagia    • History of IBS    • Hx of acne    • Hypertension        Past Surgical History:   Procedure Laterality Date   • BREAST EXCISIONAL BIOPSY Right     benign   • CHOLECYSTECTOMY     • DILATION AND CURETTAGE OF UTERUS     • ENDOMETRIAL BIOPSY     • GALLBLADDER SURGERY           Current Outpatient Medications:   •  Calcium Citrate-Vitamin D (CALCIUM + D PO), Take by mouth in the morning, Disp: , Rfl:   •  fluticasone (FLONASE) 50 mcg/act nasal spray, 2 sprays into each nostril Daily, Disp: , Rfl:   •  losartan (COZAAR) 50 mg tablet, Take 1 tablet by mouth daily, Disp: , Rfl:   •  Magnesium 250 MG TABS, Take by mouth in the morning, Disp: , Rfl:     Allergies   Allergen Reactions   • Penicillins Rash       Social History     Socioeconomic History   • Marital status: /Civil Union     Spouse name: Not on file   • Number of children: Not on file   • Years of education: Not on file   • Highest education level: Not on file   Occupational History   • Not on file   Tobacco Use   • Smoking status: Former     Types: Cigarettes     Quit date:      Years since quittin 4   • Smokeless tobacco: Never   Vaping Use   • Vaping Use: Never used   Substance and "Sexual Activity   • Alcohol use: Yes     Comment: 1 per week   • Drug use: No   • Sexual activity: Yes     Partners: Male     Birth control/protection: Post-menopausal   Other Topics Concern   • Not on file   Social History Narrative   • Not on file     Social Determinants of Health     Financial Resource Strain: Not on file   Food Insecurity: Not on file   Transportation Needs: Not on file   Physical Activity: Not on file   Stress: Not on file   Social Connections: Not on file   Intimate Partner Violence: Not on file   Housing Stability: Not on file         Review of Systems     ROS:  Constitutional: Negative for appetite change, fatigue and unexpected weight change  Respiratory: Negative  Cardiovascular: Negative  Gastrointestinal: Negative for abdominal pain and change in bowel habits/constipation/diarrhea  Breasts:  Negative other than as noted above  Genitourinary: Negative other than as noted above  Psychiatric: Negative for mood difficulties  Objective      /96 (BP Location: Right arm, Patient Position: Sitting, Cuff Size: Standard)   Ht 5' 0 75\" (1 543 m)   Wt 83 3 kg (183 lb 9 6 oz)   LMP  (LMP Unknown)   BMI 34 98 kg/m²     Physical Examination:  Patient appears well and is not in distress  Breasts are symmetrical without mass, tenderness, nipple discharge, skin changes or adenopathy  Firm tissue of the medial right breast - self palpation reveals this is not a texture change for the patient  Possible location of prior breast biopsy and clip placement  Abdomen is soft and nontender without masses  External genitals are normal without lesions or rashes  Urethral meatus and urethra are normal  Bladder is normal to palpation  Vagina is normal without discharge or bleeding  Atrophic changes  Cervix is normal without discharge or lesion  Uterus is normal, mobile, nontender without palpable mass  Adnexa are normal, nontender, without palpable mass       "

## 2023-06-22 ENCOUNTER — TELEPHONE (OUTPATIENT)
Dept: OBGYN CLINIC | Facility: CLINIC | Age: 58
End: 2023-06-22

## 2023-07-07 ENCOUNTER — HOSPITAL ENCOUNTER (OUTPATIENT)
Dept: ULTRASOUND IMAGING | Facility: HOSPITAL | Age: 58
Discharge: HOME | End: 2023-07-07
Payer: COMMERCIAL

## 2023-07-07 DIAGNOSIS — N93.0 POSTCOITAL BLEEDING: ICD-10-CM

## 2023-07-07 PROCEDURE — 76856 US EXAM PELVIC COMPLETE: CPT

## 2023-07-07 PROCEDURE — 76830 TRANSVAGINAL US NON-OB: CPT

## 2023-07-14 ENCOUNTER — TELEPHONE (OUTPATIENT)
Dept: OBGYN CLINIC | Facility: CLINIC | Age: 58
End: 2023-07-14

## 2023-07-14 NOTE — TELEPHONE ENCOUNTER
----- Message from Quinn Brown PA-C sent at 7/13/2023  4:27 PM EDT -----  Please call Joseovine Tyson to let her know her US returned showing her uterine lining is thin. This is good news - confirming bleeding coming from vaginal atrophy not a uterine source. Should alleviate with use of a lubricant and/or vaginal moisturizer. Please let me know if she has questions. Should RTO for evaluation if worsening or changing (I.e. occurring separate from IC).

## 2023-07-14 NOTE — TELEPHONE ENCOUNTER
Pt aware lining of uterus is thin. Bleeding not from lining but from vag atrophy. Pt will try moisturizers.

## 2023-10-14 ENCOUNTER — HOSPITAL ENCOUNTER (OUTPATIENT)
Dept: RADIOLOGY | Age: 58
Discharge: HOME | End: 2023-10-14
Payer: COMMERCIAL

## 2023-10-14 VITALS — WEIGHT: 183 LBS | BODY MASS INDEX: 35.93 KG/M2 | HEIGHT: 60 IN

## 2023-10-14 DIAGNOSIS — Z12.31 ENCOUNTER FOR SCREENING MAMMOGRAM FOR MALIGNANT NEOPLASM OF BREAST: ICD-10-CM

## 2023-10-14 PROCEDURE — 77067 SCR MAMMO BI INCL CAD: CPT

## 2023-10-14 PROCEDURE — 77063 BREAST TOMOSYNTHESIS BI: CPT

## 2023-12-02 ENCOUNTER — APPOINTMENT (OUTPATIENT)
Dept: LAB | Facility: CLINIC | Age: 58
End: 2023-12-02
Payer: COMMERCIAL

## 2023-12-02 DIAGNOSIS — E78.2 MIXED HYPERLIPIDEMIA: ICD-10-CM

## 2023-12-02 DIAGNOSIS — I10 BENIGN HYPERTENSION: ICD-10-CM

## 2023-12-02 LAB
ALBUMIN SERPL BCP-MCNC: 3.8 G/DL (ref 3.5–5)
ALP SERPL-CCNC: 60 U/L (ref 34–104)
ALT SERPL W P-5'-P-CCNC: 11 U/L (ref 7–52)
ANION GAP SERPL CALCULATED.3IONS-SCNC: 5 MMOL/L
AST SERPL W P-5'-P-CCNC: 13 U/L (ref 13–39)
BASOPHILS # BLD AUTO: 0.03 THOUSANDS/ÂΜL (ref 0–0.1)
BASOPHILS NFR BLD AUTO: 1 % (ref 0–1)
BILIRUB SERPL-MCNC: 0.59 MG/DL (ref 0.2–1)
BUN SERPL-MCNC: 18 MG/DL (ref 5–25)
CALCIUM SERPL-MCNC: 8.8 MG/DL (ref 8.4–10.2)
CHLORIDE SERPL-SCNC: 108 MMOL/L (ref 96–108)
CO2 SERPL-SCNC: 29 MMOL/L (ref 21–32)
CREAT SERPL-MCNC: 0.88 MG/DL (ref 0.6–1.3)
EOSINOPHIL # BLD AUTO: 0.12 THOUSAND/ÂΜL (ref 0–0.61)
EOSINOPHIL NFR BLD AUTO: 3 % (ref 0–6)
ERYTHROCYTE [DISTWIDTH] IN BLOOD BY AUTOMATED COUNT: 12.9 % (ref 11.6–15.1)
GFR SERPL CREATININE-BSD FRML MDRD: 72 ML/MIN/1.73SQ M
GLUCOSE P FAST SERPL-MCNC: 86 MG/DL (ref 65–99)
HCT VFR BLD AUTO: 40.3 % (ref 34.8–46.1)
HGB BLD-MCNC: 12.9 G/DL (ref 11.5–15.4)
IMM GRANULOCYTES # BLD AUTO: 0.01 THOUSAND/UL (ref 0–0.2)
IMM GRANULOCYTES NFR BLD AUTO: 0 % (ref 0–2)
LYMPHOCYTES # BLD AUTO: 0.64 THOUSANDS/ÂΜL (ref 0.6–4.47)
LYMPHOCYTES NFR BLD AUTO: 16 % (ref 14–44)
MCH RBC QN AUTO: 29.8 PG (ref 26.8–34.3)
MCHC RBC AUTO-ENTMCNC: 32 G/DL (ref 31.4–37.4)
MCV RBC AUTO: 93 FL (ref 82–98)
MONOCYTES # BLD AUTO: 0.27 THOUSAND/ÂΜL (ref 0.17–1.22)
MONOCYTES NFR BLD AUTO: 7 % (ref 4–12)
NEUTROPHILS # BLD AUTO: 2.93 THOUSANDS/ÂΜL (ref 1.85–7.62)
NEUTS SEG NFR BLD AUTO: 73 % (ref 43–75)
NRBC BLD AUTO-RTO: 0 /100 WBCS
PLATELET # BLD AUTO: 180 THOUSANDS/UL (ref 149–390)
PMV BLD AUTO: 9.5 FL (ref 8.9–12.7)
POTASSIUM SERPL-SCNC: 4.2 MMOL/L (ref 3.5–5.3)
PROT SERPL-MCNC: 6.6 G/DL (ref 6.4–8.4)
RBC # BLD AUTO: 4.33 MILLION/UL (ref 3.81–5.12)
SODIUM SERPL-SCNC: 142 MMOL/L (ref 135–147)
T4 FREE SERPL-MCNC: 0.72 NG/DL (ref 0.61–1.12)
TSH SERPL DL<=0.05 MIU/L-ACNC: 2.12 UIU/ML (ref 0.45–4.5)
WBC # BLD AUTO: 4 THOUSAND/UL (ref 4.31–10.16)

## 2023-12-02 PROCEDURE — 84439 ASSAY OF FREE THYROXINE: CPT

## 2023-12-02 PROCEDURE — 36415 COLL VENOUS BLD VENIPUNCTURE: CPT

## 2023-12-02 PROCEDURE — 80053 COMPREHEN METABOLIC PANEL: CPT

## 2023-12-02 PROCEDURE — 84443 ASSAY THYROID STIM HORMONE: CPT

## 2023-12-02 PROCEDURE — 85025 COMPLETE CBC W/AUTO DIFF WBC: CPT

## 2024-01-22 ENCOUNTER — OFFICE VISIT (OUTPATIENT)
Dept: OBGYN CLINIC | Facility: CLINIC | Age: 59
End: 2024-01-22
Payer: COMMERCIAL

## 2024-01-22 ENCOUNTER — APPOINTMENT (OUTPATIENT)
Dept: RADIOLOGY | Age: 59
End: 2024-01-22
Payer: COMMERCIAL

## 2024-01-22 VITALS
DIASTOLIC BLOOD PRESSURE: 94 MMHG | BODY MASS INDEX: 35.93 KG/M2 | HEART RATE: 70 BPM | HEIGHT: 60 IN | SYSTOLIC BLOOD PRESSURE: 179 MMHG | WEIGHT: 183 LBS

## 2024-01-22 DIAGNOSIS — M79.645 PAIN IN FINGER OF LEFT HAND: Primary | ICD-10-CM

## 2024-01-22 DIAGNOSIS — M79.645 PAIN IN FINGER OF LEFT HAND: ICD-10-CM

## 2024-01-22 DIAGNOSIS — M77.8 TENDONITIS OF FINGER: ICD-10-CM

## 2024-01-22 PROCEDURE — 99203 OFFICE O/P NEW LOW 30 MIN: CPT | Performed by: PHYSICIAN ASSISTANT

## 2024-01-22 PROCEDURE — 73130 X-RAY EXAM OF HAND: CPT

## 2024-01-22 RX ORDER — NAPROXEN 500 MG/1
1 TABLET ORAL 2 TIMES DAILY WITH MEALS
COMMUNITY
Start: 2024-01-12

## 2024-01-22 NOTE — PATIENT INSTRUCTIONS
Tendinitis   WHAT YOU NEED TO KNOW:   Tendinitis is painful inflammation or breakdown of your tendons. It may also be called tendinopathy. Tendinitis often occurs in the knee, shoulder, ankle, hip, or elbow.  DISCHARGE INSTRUCTIONS:   Return to the emergency department if:   You have increased redness over the joint, or swelling in the joint.    You suddenly cannot move your joint.    Call your doctor if:   You have increased pain even after you take medicine.    You have questions or concerns about your condition or care.    Medicines:   Acetaminophen  decreases pain and fever. It is available without a doctor's order. Ask how much to take and how often to take it. Follow directions. Read the labels of all other medicines you are using to see if they also contain acetaminophen, or ask your doctor or pharmacist. Acetaminophen can cause liver damage if not taken correctly.    NSAIDs , such as ibuprofen, help decrease swelling, pain, and fever. This medicine is available with or without a doctor's order. NSAIDs can cause stomach bleeding or kidney problems in certain people. If you take blood thinner medicine, always ask your healthcare provider if NSAIDs are safe for you. Always read the medicine label and follow directions.    Take your medicine as directed.  Contact your healthcare provider if you think your medicine is not helping or if you have side effects. Tell your provider if you are allergic to any medicine. Keep a list of the medicines, vitamins, and herbs you take. Include the amounts, and when and why you take them. Bring the list or the pill bottles to follow-up visits. Carry your medicine list with you in case of an emergency.    Manage tendinitis:   Rest your tendon  as directed to help it heal. Ask your healthcare provider if you need to stop putting weight on your affected area.    Apply ice  to help decrease swelling and pain. Use an ice pack, or put crushed ice in a plastic bag. Cover the bag with  a towel before you place it on the affected area. Apply ice for 10 to 15 minutes every hour, or as directed.    Use a support device , such as a cane, splint, shoe insert, or brace. A support device may help reduce your pain.    Go to physical therapy  if directed. A physical therapist can teach you exercises to help improve movement and strength, and to decrease pain. You may also learn how to improve your posture, and how to lift or exercise correctly.    Prevent tendinitis:   Warm up and cool down when you exercise.  Run in place slowly or walk at a brisk pace to warm your muscles before you exercise. When you finish exercising, walk for a few minutes to cool down.         Exercise regularly  to strengthen the muscles around your joint. Ease into an exercise routine for the first 3 weeks to prevent another injury. Ask your healthcare provider about the best exercise plan for you. Rest fully between activities.    Use the right equipment  for sports and exercise. Wear braces or tape around weak joints as directed.    Follow up with your doctor as directed:  Write down your questions so you remember to ask them during your visits.  © Copyright [employer] 2023 Information is for End User's use only and may not be sold, redistributed or otherwise used for commercial purposes.  The above information is an  only. It is not intended as medical advice for individual conditions or treatments. Talk to your doctor, nurse or pharmacist before following any medical regimen to see if it is safe and effective for you.

## 2024-01-22 NOTE — PROGRESS NOTES
Orthopaedic Surgery - Office Note  Paola Story (58 y.o. female)   : 1965   MRN: [id number]  Encounter Date: 2024    Chief Complaint   Patient presents with    Left Hand - Pain         Assessment/Plan  Diagnoses and all orders for this visit:    Pain in ring finger of left hand  -     XR hand 3+ vw left; Future  -     Ambulatory Referral to Occupational Therapy; Future  -     Ambulatory Referral to Hand Surgery; Future    Extensor tendonitis of left ring finger  -     Ambulatory Referral to Occupational Therapy; Future  -     Ambulatory Referral to Hand Surgery; Future    Other orders  -     naproxen (NAPROSYN) 500 mg tablet; Take 1 tablet by mouth 2 (two) times a day with meals    The diagnosis as well as treatment options were reviewed with the patient in the office today.  I would recommend occupational therapy evaluation and treatment.  She will ice the hand for comfort 20 minutes on 1 hour off 3 times a day.  She will continue naproxen with food for pain and inflammation.  If her symptoms have not improved in 4 to 6 weeks I would recommend follow-up evaluation with hand surgeon.  All question and concerns were answered in the office today.  No restrictions for the patient, she may continue to play guitar as tolerated     Return for Recheck in 4 to 6 weeks with hand surgeon.        History of Present Illness  This is a new patient with left ring finger pain.  She localizes the pain to the MCP joint of the left ring finger primarily.  There is no trauma to the hand.  The symptoms have been ongoing since she was practicing heavily for a wedding regarding her playing of a guitar.  She reports the symptoms were treated by her PCP with an oral steroid which did help but then the pain returned.  She describes stiffness in the morning when she wakes up.  There is no numbness or tingling symptoms.  She denies any locking or clicking symptoms.  She is using naproxen with food.  The pain is made worse by  extending the ring finger.  She denies any pain with flexion of the ring finger.    Review of Systems  Pertinent items are noted in HPI.  All other systems were reviewed and are negative.    Physical Exam  BP (!) 179/94 (BP Location: Right arm, Patient Position: Sitting, Cuff Size: Large) Comment: rushed to get to the appt  Pulse 70   Ht 5' (1.524 m)   Wt 83 kg (183 lb)   LMP  (LMP Unknown)   BMI 35.74 kg/m²   Cons: Appears well.  No apparent distress.  Psych: Alert. Oriented x3.  Mood and affect normal.    On examination patient's left finger is nontender at the MCP PIP and DIP joint.  She has near full active and passive flexion and extension at the MCP PIP and DIP joint.  She has pain against resistance to extension at the MCP joint of the left ring finger with pain following the course of the fourth metacarpal.  She is mildly tender to palpation along the fourth metacarpal.  There is no skin breakdown lesion or signs of infection.  There is no active triggering or palpable nodules at the A1 pulley.  Capillary refill is normal.  Negative Tinel's at the cubital tunnel.  Elbow exam is unremarkable.  Distal radial and ulnar pulses are +2.  She is otherwise nontender throughout palpation of the wrist hand and all digits.         X-ray performed in the office today 3 views of the left hand show no acute fractures or dislocations.  No significant degenerative changes are seen.  X-rays were reviewed from orthopedic standpoint will await official radiologist interpretation      Studies Reviewed  12/21/2023 PCP notes were reviewed by myself in the office today.    Procedures  No procedures today.    Medical, Surgical, Family, and Social History  The patient's medical history, family history, and social history, were reviewed and updated as appropriate.    Past Medical History:   Diagnosis Date    H/O amenorrhea     H/O menorrhagia     History of IBS     Hx of acne     Hypertension        Past Surgical History:    Procedure Laterality Date    BREAST EXCISIONAL BIOPSY Right     benign    CHOLECYSTECTOMY      DILATION AND CURETTAGE OF UTERUS      ENDOMETRIAL BIOPSY      GALLBLADDER SURGERY         Family History   Problem Relation Age of Onset    No Known Problems Mother     No Known Problems Father     No Known Problems Sister     Colon cancer Maternal Grandmother 78    No Known Problems Maternal Grandfather     No Known Problems Paternal Grandmother     No Known Problems Paternal Grandfather     No Known Problems Brother     No Known Problems Son     No Known Problems Son     No Known Problems Son     No Known Problems Maternal Aunt     No Known Problems Maternal Aunt     Breast cancer Neg Hx        Social History     Occupational History    Not on file   Tobacco Use    Smoking status: Former     Current packs/day: 0.00     Types: Cigarettes     Quit date:      Years since quittin.0    Smokeless tobacco: Never   Vaping Use    Vaping status: Never Used   Substance and Sexual Activity    Alcohol use: Yes     Comment: 1 per week    Drug use: No    Sexual activity: Yes     Partners: Male     Birth control/protection: Post-menopausal       Allergies   Allergen Reactions    Penicillins Rash         Current Outpatient Medications:     Calcium Citrate-Vitamin D (CALCIUM + D PO), Take by mouth in the morning, Disp: , Rfl:     fluticasone (FLONASE) 50 mcg/act nasal spray, 2 sprays into each nostril Daily, Disp: , Rfl:     losartan (COZAAR) 50 mg tablet, Take 1 tablet by mouth daily, Disp: , Rfl:     Magnesium 250 MG TABS, Take by mouth in the morning, Disp: , Rfl:     naproxen (NAPROSYN) 500 mg tablet, Take 1 tablet by mouth 2 (two) times a day with meals, Disp: , Rfl:       Wade Rollins PA-C

## 2024-01-30 ENCOUNTER — EVALUATION (OUTPATIENT)
Dept: OCCUPATIONAL THERAPY | Facility: CLINIC | Age: 59
End: 2024-01-30
Payer: COMMERCIAL

## 2024-01-30 DIAGNOSIS — M77.8 TENDONITIS OF FINGER: ICD-10-CM

## 2024-01-30 DIAGNOSIS — M79.645 PAIN OF FINGER OF LEFT HAND: Primary | ICD-10-CM

## 2024-01-30 DIAGNOSIS — M79.645 PAIN IN FINGER OF LEFT HAND: ICD-10-CM

## 2024-01-30 PROCEDURE — 97110 THERAPEUTIC EXERCISES: CPT

## 2024-01-30 PROCEDURE — 97165 OT EVAL LOW COMPLEX 30 MIN: CPT

## 2024-01-30 NOTE — PROGRESS NOTES
OT Evaluation     Today's date: 2024  Patient name: Paola Story  : 1965  MRN: [id number]  Referring provider: No ref. provider found  Dx:   Encounter Diagnosis     ICD-10-CM    1. Pain of finger of left hand  M79.645                      Assessment  Assessment details: Patient presenting with a diagnosis of extensor tendonitis of the left RF with pain primarily at the MCP joint. Patient symptoms began when they were practicing guitar before a wedding repeatedly. Patient initially saw physician regarding this diagnosis on 23. X-rays displayed no osseus abnormalities. Patient was prescribed an oral steroid which patient reported has had a mild effect. Patient reports no triggering, clicking, or popping. No numbness and tingling.Patient reports mild-moderate pain during general ROM and while playing guitar. Digit ROM is WNL but painful and tight in the MP joint likely due to interosseus weakness and pain. Patient displays full flexion and extension in all digits.  strength is decreased secondary to pain. Edema is present on the dorsal hand around the MP joint as well as in the 3rd and 4th web space. Patient was provided a custom HEP and instructed on how to complete it. See below for a more detailed assessment.     Impairments: abnormal or restricted ROM, abnormal movement, activity intolerance, impaired physical strength, lacks appropriate home exercise program, pain with function and weight-bearing intolerance    Symptom irritability: lowUnderstanding of Dx/Px/POC: good   Prognosis: good    Goals  STG:    Patient will display decreased edema in the affected digit by 1-3 mm in 4 weeks    Patient will report decreased pain by 1-2 grades in the wrist during functional movement in 4 weeks    Patient will increase  strength by >5 lbs in 4 weeks    LTG:    Patient will display digit ROM WFLin 8 weeks or discharge    Patient will report resolution of pain in the wrist during all activities in  "8 weeks or discharge    Patient will display  strength WFL in 8 weeks or discharge    Patient will fully return to leisure activities (guitar) without pain or weakness in 8 weeks    Patient will increase FOTO score by >20 points in 8 weeks or discharge    Plan  Plan details: Patient would benefit from skilled OP OT hand therapy services 1x per week for 8 weeks to increase ROM and return to full functional status.   Patient would benefit from: custom splinting and OT eval  Referral necessary: No  Planned modality interventions: ultrasound, thermotherapy: paraffin bath and thermotherapy: hydrocollator packs  Planned therapy interventions: balance/weight bearing training, coordination, home exercise program, functional ROM exercises, flexibility, IASTM, manual therapy, joint mobilization, orthotic fitting/training, strengthening, stretching, therapeutic activities and therapeutic exercise  Frequency: 1x week  Duration in visits: 10  Plan of Care beginning date: 1/30/2024  Plan of Care expiration date: 3/29/2024  Treatment plan discussed with: patient        Subjective Evaluation    History of Present Illness  Mechanism of injury: Mechanism: Repeatedly playing guitar    Subjective:  \"I play guitar, and [facility] and it started to [facility]\". \"I took the medication provided by my doctor and it helped reduce the swelling but after I finished it came right back\". \"I had an x-ray and it came back negative for arthritis\". \"I still have stiffness and swelling\". \"The swelling is the worst it's been in a few weeks\". \"I can only take a few days off of playing guitar because I need to practice\". \"When I wake up in the morning my finger is very stiff\". \"I have no popping or clicking\". \"Most of the time when I play it is ok except when I need to stretch my pinky\".           Not a recurrent problem   Quality of life: good    Patient Goals  Patient goals for therapy: decreased pain, increased " motion, increased strength, return to sport/leisure activities and decreased edema    Pain  Current pain rating: 3  At best pain ratin  At worst pain ratin  Location: Left RF MCP Joint and dorsal hand  Quality: sharp  Relieving factors: relaxation and rest  Aggravating factors: lifting (Playing guitar, sleeping, gripping, lids, jars, pulling on clothing)  Progression: improved    Social Support    Employment status: working  Hand dominance: right      Diagnostic Tests  X-ray: normal  Treatments  Previous treatment: medication  Current treatment: occupational therapy      Objective     Observations     Left Wrist/Hand   Positive for edema.     Additional Observation Details  L: Edema around MCP joint and 4th web space    Tenderness     Left Wrist/Hand   No tenderness in the MCP joint and PIP joint.     Neurological Testing     Sensation     Wrist/Hand   Left   Intact: light touch    Right   Intact: light touch    Active Range of Motion     Left Wrist   Normal active range of motion    Right Wrist   Normal active range of motion    Left Thumb     Opposition: Full opposition    Right Thumb   Opposition: Full opposition     Additional Active Range of Motion Details  B/L Full composite fist with tightness noted in the extensors. Increased pain and tightness noted in intrinsic plus position and when forming a flat fist    Strength/Myotome Testing     Left Wrist/Hand   Normal wrist strength     (2nd hand position)     Trial 1: 41.6    Thumb Strength  Key/Lateral Pinch     Trial 1: 15  Tip/Two-Point Pinch     Trial 1: 14  Palmar/Three-Point Pinch     Trial 1: 15    Right Wrist/Hand   Normal wrist strength     (2nd hand position)     Trial 1: 57.7    Thumb Strength   Key/Lateral Pinch     Trial 1: 15  Tip/Two-Point Pinch     Trial 1: 15  Palmar/Three-Point Pinch     Trial 1: 15    Tests     Left Elbow   Negative Tinel's sign (cubital tunnel).     Left Wrist/Hand   Negative Tinel's sign (medial nerve) and  Tinel's sign (radial tunnel).     Additional Tests Details  L:   Resisted RF extension causes increased pain  Resisted intrinsic plus position causes increased pain    Swelling     Left Wrist/Hand   Ring     Proximal: 5.6 cm    Middle: 5.6 cm    Distal: 4.8 cm  Circumference MCP: 18.4 cm    Right Wrist/Hand   Ring     Proximal: 5.6 cm    Middle: 5.2 cm    Distal: 4.2 cm  Circumference MCP: 18 cm             Precautions: Universal      Manuals 1/30            STM                                                    Neuro Re-Ed                                                                                                        Ther Ex             HEP Tendon glide    Reverse Blocking    Tabletop extension    Extensor stretch            Digit PROM             Digit Extension on table             Metal Sphere             Pencil Jumps             Pencil Push Ups             Digiflex             Ext web  TB                        Ther Activity             Keypegs             Coins                                                    Modalities             P

## 2024-02-06 ENCOUNTER — OFFICE VISIT (OUTPATIENT)
Dept: OCCUPATIONAL THERAPY | Facility: CLINIC | Age: 59
End: 2024-02-06
Payer: COMMERCIAL

## 2024-02-06 DIAGNOSIS — M77.8 TENDONITIS OF FINGER: ICD-10-CM

## 2024-02-06 DIAGNOSIS — M79.645 PAIN OF FINGER OF LEFT HAND: Primary | ICD-10-CM

## 2024-02-06 DIAGNOSIS — M79.645 PAIN IN FINGER OF LEFT HAND: ICD-10-CM

## 2024-02-06 PROCEDURE — 97140 MANUAL THERAPY 1/> REGIONS: CPT

## 2024-02-06 PROCEDURE — 97110 THERAPEUTIC EXERCISES: CPT

## 2024-02-06 NOTE — PROGRESS NOTES
"Daily Note     Today's date: 2024  Patient name: Paola Story  : 1965  MRN: [id number]  Referring provider: Wade Rollins PA*  Dx:   Encounter Diagnosis     ICD-10-CM    1. Pain of finger of left hand  M79.645       2. Pain in ring finger of left hand  M79.645       3. Extensor tendonitis of left ring finger  M77.8                      Subjective: \"It was very sore yesterday, I think the exercises over the week added up\". \"Today it feels much better\".       Objective: See treatment diary below      Assessment: Tolerated treatment well. Intrinsic plus position causes the most discomfort. Digit abduction causes slight pain in the medial aspect of the digits. Patient reported pain never exceeded 2/10 during exercises and PROM.       Plan: Continue per plan of care.  Progress treatment as tolerated.       Precautions: Universal      Manuals            STM  8'                                                  Neuro Re-Ed                                                                                                        Ther Ex             HEP Tendon glide    Reverse Blocking    Tabletop extension    Extensor stretch            Digit PROM  4'           Digit Extension on table  x20           Metal Sphere  2'           Pencil Jumps  x30           Pencil Push Ups  x30           Digiflex  Y iso x10    R comp x20           Ext web  TB x 1 all RB           [place] abduction  All marbles           Ther Activity             Keypegs  X1 translation           Coins                                                    Modalities             P  5'                             "

## 2024-02-13 ENCOUNTER — APPOINTMENT (OUTPATIENT)
Dept: OCCUPATIONAL THERAPY | Facility: CLINIC | Age: 59
End: 2024-02-13
Payer: COMMERCIAL

## 2024-02-15 ENCOUNTER — OFFICE VISIT (OUTPATIENT)
Dept: OCCUPATIONAL THERAPY | Facility: CLINIC | Age: 59
End: 2024-02-15
Payer: COMMERCIAL

## 2024-02-15 DIAGNOSIS — M79.645 PAIN OF FINGER OF LEFT HAND: Primary | ICD-10-CM

## 2024-02-15 DIAGNOSIS — M79.645 PAIN IN FINGER OF LEFT HAND: ICD-10-CM

## 2024-02-15 DIAGNOSIS — M77.8 TENDONITIS OF FINGER: ICD-10-CM

## 2024-02-15 PROCEDURE — 97110 THERAPEUTIC EXERCISES: CPT

## 2024-02-15 PROCEDURE — 97140 MANUAL THERAPY 1/> REGIONS: CPT

## 2024-02-15 NOTE — PROGRESS NOTES
"Daily Note     Today's date: 2/15/2024  Patient name: Paola Story  : 1965  MRN: [id number]  Referring provider: Wade Rollins PA*  Dx:   Encounter Diagnosis     ICD-10-CM    1. Pain of finger of left hand  M79.645       2. Pain in ring finger of left hand  M79.645       3. Extensor tendonitis of left ring finger  M77.8                        Subjective: \"It is still very sore in the morning\". 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 \"It feels the same\".       Objective: See treatment diary below      Assessment: Tolerated treatment well. Intrinsic plus position continues to cause the most discomfort. ROM is full.       Plan: Continue per plan of care.  Progress treatment as tolerated.       Precautions: Universal      Manuals 1/30 2/6 2/15          STM  8' 8'                                                 Neuro Re-Ed                                                                                                        Ther Ex             HEP Tendon glide    Reverse Blocking    Tabletop extension    Extensor stretch            Digit PROM  4' 4' intrinsic plus          Digit Extension on table  x20 x20          Metal Sphere  2' 2'          Pencil Jumps  x30 x30          Pencil Push Ups  x30 x30          Digiflex  Y iso x10    R comp x20 R iso x10    G compx20          Ext web  TB x 1 all RB TB x1 all RB          [place] abduction  All marbles All mabrles          Ther Activity             Keypegs  X1 translation x1          Coins                                                    Modalities             MHP  5' 5'                            "

## 2024-02-20 ENCOUNTER — OFFICE VISIT (OUTPATIENT)
Dept: OCCUPATIONAL THERAPY | Facility: CLINIC | Age: 59
End: 2024-02-20
Payer: COMMERCIAL

## 2024-02-20 DIAGNOSIS — M77.8 TENDONITIS OF FINGER: ICD-10-CM

## 2024-02-20 DIAGNOSIS — M79.645 PAIN OF FINGER OF LEFT HAND: Primary | ICD-10-CM

## 2024-02-20 DIAGNOSIS — M79.645 PAIN IN FINGER OF LEFT HAND: ICD-10-CM

## 2024-02-20 PROCEDURE — 97140 MANUAL THERAPY 1/> REGIONS: CPT

## 2024-02-20 PROCEDURE — 97110 THERAPEUTIC EXERCISES: CPT

## 2024-02-20 PROCEDURE — 97530 THERAPEUTIC ACTIVITIES: CPT

## 2024-02-20 NOTE — PROGRESS NOTES
"Daily Note     Today's date: 2024  Patient name: Paola Story  : 1965  MRN: [id number]  Referring provider: Wade Rollins PA*  Dx:   Encounter Diagnosis     ICD-10-CM    1. Pain of finger of left hand  M79.645       2. Extensor tendonitis of left ring finger  M77.8       3. Pain in ring finger of left hand  M79.645                        Subjective: \"I was doing a deep massage this weekend and it really increased my pain which lasted from a day\".        Objective: See treatment diary below      Assessment: Tolerated treatment well. Patient reported increased shooting pain following massage directly over the 4th and 5th lumbrical and interossei which resolved after 1 day. Patient ROM continues to be full but they experience discomfort in the intrinsic plus position.       Plan: Continue per plan of care.  Progress treatment as tolerated.       Precautions: Universal      Manuals 1/30 2/6 2/15 2/20         STM  8' 8' 8'                                                Neuro Re-Ed                                                                                                        Ther Ex             HEP Tendon glide    Reverse Blocking    Tabletop extension    Extensor stretch            Digit PROM  4' 4' intrinsic plus 4'         Digit Extension on table  x20 x20 x20         Metal Sphere  2' 2' 2'         Pencil Jumps  x30 x30 x30         Pencil Push Ups  x30 x30 x30         Digiflex  Y iso x10    R comp x20 R iso x10    G compx20 R iso x10    G comp x20         Ext web  TB x 1 all RB TB x1 all RB TB x1 all R         Intrinsic + sponge squeeze    x20         [place] abduction  All marbles All mabrles All marbles RF/SF MF/RF         Ther Activity             Keypegs  X1 translation x1 x1         Coins                                                    Modalities             P  5' 5' 5'                           "

## 2024-02-21 PROBLEM — Z12.39 SCREENING FOR MALIGNANT NEOPLASM OF BREAST: Status: RESOLVED | Noted: 2018-12-10 | Resolved: 2024-02-21

## 2024-02-21 PROBLEM — Z01.419 ENCOUNTER FOR GYNECOLOGICAL EXAMINATION (GENERAL) (ROUTINE) WITHOUT ABNORMAL FINDINGS: Status: RESOLVED | Noted: 2018-12-10 | Resolved: 2024-02-21

## 2024-02-27 ENCOUNTER — OFFICE VISIT (OUTPATIENT)
Dept: OCCUPATIONAL THERAPY | Facility: CLINIC | Age: 59
End: 2024-02-27
Payer: COMMERCIAL

## 2024-02-27 DIAGNOSIS — M77.8 TENDONITIS OF FINGER: Primary | ICD-10-CM

## 2024-02-27 PROCEDURE — 97110 THERAPEUTIC EXERCISES: CPT

## 2024-02-27 NOTE — PROGRESS NOTES
"Daily Note     Today's date: 2024  Patient name: Paola Story  : 1965  MRN: [id number]  Referring provider: Wade Rollins PA*  Dx:   Encounter Diagnosis     ICD-10-CM    1. Extensor tendonitis of left ring finger  M77.8                        Subjective: \"It hurts worse today\".     Objective: See treatment diary below  [id number] MHP  [id number] Manual  [id number] unsup  4166-7515 TE  [id number] unsup    Assessment: Tolerated treatment well. Patient displayed increased edema on this date in the 4th web space. Lumbricals and interossei sensitivity. Intrinsic plus and hook fist continue to cause discomfort. Held gripping exercise son this date.        Plan: Continue per plan of care.  Progress treatment as tolerated.       Precautions: Universal      Manuals 1/30 2/6 2/15 2/20 2/27        STM  8' 8' 8' 8'                                               Neuro Re-Ed                                                                                                        Ther Ex             HEP Tendon glide    Reverse Blocking    Tabletop extension    Extensor stretch            Digit PROM  4' 4' intrinsic plus 4'         Digit Extension on table  x20 x20 x20 x20        Metal Sphere  2' 2' 2' 2'        Pencil Jumps  x30 x30 x30 x30        Pencil Push Ups  x30 x30 x30 unable        Digiflex  Y iso x10    R comp x20 R iso x10    G compx20 R iso x10    G comp x20         Ext web  TB x 1 all RB TB x1 all RB TB x1 all R         Intrinsic + sponge squeeze    x20 x20        [place] abduction  All marbles All mabrles All marbles RF/SF MF/RF All marbles RF/SF MF/RF        Ther Activity             Keypegs  X1 translation x1 x1 x1        Coins                                                    Modalities             [facility]  5' 5' 5' 5'                          "

## 2024-02-28 ENCOUNTER — OFFICE VISIT (OUTPATIENT)
Dept: OBGYN CLINIC | Facility: CLINIC | Age: 59
End: 2024-02-28
Payer: COMMERCIAL

## 2024-02-28 VITALS — WEIGHT: 183 LBS | HEIGHT: 60 IN | BODY MASS INDEX: 35.93 KG/M2

## 2024-02-28 DIAGNOSIS — M19.042: Primary | ICD-10-CM

## 2024-02-28 DIAGNOSIS — M77.8 TENDONITIS OF FINGER: ICD-10-CM

## 2024-02-28 DIAGNOSIS — M79.645 PAIN IN FINGER OF LEFT HAND: ICD-10-CM

## 2024-02-28 PROCEDURE — 99204 OFFICE O/P NEW MOD 45 MIN: CPT | Performed by: SURGERY

## 2024-02-28 PROCEDURE — 20600 DRAIN/INJ JOINT/BURSA W/O US: CPT | Performed by: SURGERY

## 2024-02-28 RX ORDER — TRIAMCINOLONE ACETONIDE 40 MG/ML
20 INJECTION, SUSPENSION INTRA-ARTICULAR; INTRAMUSCULAR
Status: COMPLETED | OUTPATIENT
Start: 2024-02-28 | End: 2024-02-28

## 2024-02-28 RX ORDER — LIDOCAINE HYDROCHLORIDE 10 MG/ML
1 INJECTION, SOLUTION INFILTRATION; PERINEURAL
Status: COMPLETED | OUTPATIENT
Start: 2024-02-28 | End: 2024-02-28

## 2024-02-28 RX ADMIN — TRIAMCINOLONE ACETONIDE 20 MG: 40 INJECTION, SUSPENSION INTRA-ARTICULAR; INTRAMUSCULAR at 09:45

## 2024-02-28 RX ADMIN — LIDOCAINE HYDROCHLORIDE 1 ML: 10 INJECTION, SOLUTION INFILTRATION; PERINEURAL at 09:45

## 2024-02-28 NOTE — PROGRESS NOTES
Calvin Guillen M.D.  Attending, Orthopaedic Surgery  Hand, Wrist, and Elbow Surgery  [facility]      ORTHOPAEDIC HAND, WRIST, AND ELBOW OFFICE  VISIT       ASSESSMENT/PLAN:      58 year old female here for her left ring MP joint arthritis and pain causing secondary flexor tendonitis. On exam, she has ring finger flexor tendonitis along with dorsal pain over the MP joint on both the radial and ulnar aspects of the joint. Due to the amount of swelling over the joint it's difficult to determine if the extensor tendon is subluxing.   Treatment options were discussed with the patient that includes 1) oral steroids and re-evaluate in 2 weeks. 2) We do the oral steroids and a localized steroid in to the ring MP joint. Patient has tried a oral steroid that helped with the swelling, she wishes to proceed with the localized injection.   We will follow up in 3 weeks to see how the injection did for her pain and swelling. At that time if it helped, then we know it's the arthritis. If not all the pain is gone, we may entertain an MRI or ultrasound to evaluate the extensor tendon. Patient shows understanding and agrees with this plan of care.  We will follow up in 3 weeks.    The patient verbalized understanding of exam findings and treatment plan. We engaged in the shared decision-making process and treatment options were discussed at length with the patient. Surgical and conservative management discussed today along with risks and benefits.    Diagnoses and all orders for this visit:    Pain in ring finger of left hand  -     Ambulatory Referral to Hand Surgery    Extensor tendonitis of left ring finger  -     Ambulatory Referral to Hand Surgery        Follow Up:  Return in about 3 weeks (around 3/20/2024) for left hand.    To Do Next Visit:  Re-evaluation of current issue      General Discussions:  Osteoarthritis:  The anatomy and physiology of osteoarthritis was discussed with the patient today in  the office.  Deterioration of the articular cartilage eventually leads to altered mobility at the joint, resulting in joint subluxation, osteophyte formation, cystic changes, as well as subchondral sclerosis.  Eventually, pain, limited mobility, and compensatory hypermobility at surrounding joints may develop.  While normal activity and usage of the joint may provide a painful experience to the patient, this typically does not result in damage to the limb.  Treatment options include splints to decreased joint edema, pain, and inflammation.  Therapy exercises to strengthen the surrounding musculature may relieve pain, but do not alter the overall continued development of osteoarthritis.  Oral medications, topical medications, corticosteroid injections may decrease pain and increase overall function.  Eventually, some patients may require surgical intervention.       ____________________________________________________________________________________________________________________________________________      CHIEF COMPLAINT:  Chief Complaint   Patient presents with    Left Hand - Pain, Swelling     Pain and swelling in between the ring and pinky finger.       SUBJECTIVE:  Paola Story is a 58 y.o. year old RHD female who presents today for consultation for ring finger pain of the left hand referred by Wade Rollins PA-C.  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 She reports that her PCP prescribed Naproxen that didn't help with the pain but helped the swelling.. There was also a medrol dose isaias. She was practicing heavily for an upcoming wedding when the symptoms started, which was at the end of November.       Pain/symptom timing:  Worse during the day when active  Pain/symptom context:  Worse with activites and work  Pain/symptom modifying factors:  Rest makes better, activities make  worse  Pain/symptom associated signs/symptoms: none    Prior treatment   NSAIDsYes Naproxen  Injections No   Bracing/Orthotics No    Physical Therapy No     I have personally reviewed all the relevant PMH, PSH, SH, FH, Medications and allergies      PAST MEDICAL HISTORY:  Past Medical History:   Diagnosis Date    H/O amenorrhea     H/O menorrhagia     History of IBS     Hx of acne     Hypertension        PAST SURGICAL HISTORY:  Past Surgical History:   Procedure Laterality Date    BREAST EXCISIONAL BIOPSY Right 2001    benign    CHOLECYSTECTOMY      DILATION AND CURETTAGE OF UTERUS      ENDOMETRIAL BIOPSY      GALLBLADDER SURGERY         FAMILY HISTORY:  Family History   Problem Relation Age of Onset    No Known Problems Mother     No Known Problems Father     No Known Problems Sister     Colon cancer Maternal Grandmother 78    No Known Problems Maternal Grandfather     No Known Problems Paternal Grandmother     No Known Problems Paternal Grandfather     No Known Problems Brother     No Known Problems Son     No Known Problems Son     No Known Problems Son     No Known Problems Maternal Aunt     No Known Problems Maternal Aunt     Breast cancer Neg Hx        SOCIAL HISTORY:  Social History     Tobacco Use    Smoking status: Former     Current packs/day: 0.00     Types: Cigarettes     Quit date:      Years since quittin.    Smokeless tobacco: Never   Vaping Use    Vaping status: Never Used   Substance Use Topics    Alcohol use: Yes     Comment: 1 per week    Drug use: No       MEDICATIONS:    Current Outpatient Medications:     Calcium Citrate-Vitamin D (CALCIUM + D PO), Take by mouth in the morning, Disp: , Rfl:     fluticasone (FLONASE) 50 mcg/act nasal spray, 2 sprays into each nostril Daily, Disp: , Rfl:     losartan (COZAAR) 50 mg tablet, Take 1 tablet by mouth daily, Disp: , Rfl:     Magnesium 250 MG TABS, Take by mouth in the morning, Disp: , Rfl:     naproxen (NAPROSYN) 500 mg tablet, Take 1 tablet  "by mouth 2 (two) times a day with meals (Patient not taking: Reported on 2/28/2024), Disp: , Rfl:     ALLERGIES:  Allergies   Allergen Reactions    Penicillins Rash           REVIEW OF SYSTEMS:  Review of Systems   Constitutional:  Negative for chills, fever and unexpected weight change.   HENT:  Negative for hearing loss, nosebleeds and sore throat.    Eyes:  Negative for pain, redness and visual disturbance.   Respiratory:  Negative for cough, shortness of breath and wheezing.    Cardiovascular:  Negative for chest pain, palpitations and leg swelling.   Gastrointestinal:  Negative for abdominal pain and nausea.   Genitourinary:  Negative for dyspareunia, dysuria and frequency.   Skin:  Negative for rash and wound.   Neurological:  Negative for dizziness, numbness and headaches.   Psychiatric/Behavioral:  Negative for decreased concentration and suicidal ideas. The patient is not nervous/anxious.        VITALS:  There were no vitals filed for this visit.    LABS:  HgA1c: No results found for: \"HGBA1C\"  BMP:   Lab Results   Component Value Date    CALCIUM 8.8 12/02/2023    K 4.2 12/02/2023    CO2 29 12/02/2023     12/02/2023    BUN 18 12/02/2023    CREATININE 0.88 12/02/2023       _____________________________________________________  PHYSICAL EXAMINATION:  General: well developed and well nourished, alert, oriented times 3, and appears comfortable  Psychiatric: Normal  HEENT: Normocephalic, Atraumatic Trachea Midline, No torticollis  Pulmonary: No audible wheezing or respiratory distress   Abdomen/GI: Non tender, non distended   Cardiovascular: No pitting edema, 2+ radial pulse   Skin: No masses, erythema, lacerations, fluctation, ulcerations  Neurovascular: Sensation Intact to the Median, Ulnar, Radial Nerve, Motor Intact to the Median, Ulnar, Radial Nerve, and Pulses Intact  Musculoskeletal: Normal, except as noted in detailed exam and in HPI.      MUSCULOSKELETAL EXAMINATION:    Left ring:      Swelling " "and ecchymosis noted over the ring finger on the dorsal aspect of the hand  Pain with flexion at the MP joint and swelling  Difficulty with full composite fist with pain  Sensation intact to light touch   Brisk capillary refill  ___________________________________________________  STUDIES REVIEWED:  I have personally reviewed AP lateral and oblique radiographs of left hand 3 views from 1/22/2024  which demonstrate mild degenerative changes MP joint ring finger       PROCEDURES PERFORMED:  Small joint arthrocentesis: L ring MCP  Universal Protocol:  Consent: Verbal consent obtained.  Risks and benefits: risks, benefits and alternatives were discussed  Consent given by: patient  Time out: Immediately prior to procedure a \"time out\" was called to verify the correct patient, procedure, equipment, support staff and site/side marked as required.  Timeout called at: 2/28/2024 10:24 AM.  Patient understanding: patient states understanding of the procedure being performed  Site marked: the operative site was marked  Supporting Documentation  Indications: pain   Procedure Details  Location: ring finger - L ring MCP  Preparation: Patient was prepped and draped in the usual sterile fashion  Needle size: 25 G  Ultrasound guidance: no  Approach: volar  Medications administered: 1 mL lidocaine 1 %; 20 mg triamcinolone acetonide 40 mg/mL    Patient tolerance: patient tolerated the procedure well with no immediate complications  Dressing:  Sterile dressing applied        _____________________________________________________      Scribe Attestation      I,:  Nehal Issa am acting as a scribe while in the presence of the attending physician.:       I,:  Calvin Guillen MD personally performed the services described in this documentation    as scribed in my presence.:                    "

## 2024-03-21 ENCOUNTER — OFFICE VISIT (OUTPATIENT)
Dept: OBGYN CLINIC | Facility: CLINIC | Age: 59
End: 2024-03-21
Payer: COMMERCIAL

## 2024-03-21 VITALS
SYSTOLIC BLOOD PRESSURE: 159 MMHG | BODY MASS INDEX: 35.53 KG/M2 | HEIGHT: 60 IN | HEART RATE: 68 BPM | DIASTOLIC BLOOD PRESSURE: 84 MMHG | WEIGHT: 181 LBS

## 2024-03-21 DIAGNOSIS — M65.9 SYNOVITIS OF LEFT HAND: ICD-10-CM

## 2024-03-21 DIAGNOSIS — M77.8 TENDONITIS OF FINGER: Primary | ICD-10-CM

## 2024-03-21 DIAGNOSIS — M19.042: ICD-10-CM

## 2024-03-21 PROCEDURE — 99214 OFFICE O/P EST MOD 30 MIN: CPT | Performed by: SURGERY

## 2024-03-21 RX ORDER — MELOXICAM 7.5 MG/1
7.5 TABLET ORAL DAILY
Qty: 40 TABLET | Refills: 1 | Status: SHIPPED | OUTPATIENT
Start: 2024-03-21

## 2024-03-21 NOTE — PROGRESS NOTES
Calvin Guillen M.D.  Attending, Orthopaedic Surgery  Hand, Wrist, and Elbow Surgery  [facility]      ORTHOPAEDIC HAND, WRIST, AND ELBOW OFFICE  VISIT       ASSESSMENT/PLAN:      58 year old female here for her left ring MP joint pain and swelling, possible tenosynovitis vs arthritic in nature, possible sagittal band rupture     Patient underwent MP injection at last visit which did not provide much relief other than some decreased swelling in the area. She has also tried oral steroids with no longstanding relief and Naproxen as well. Given her lack of improvement with conservative measures and non-diagnostic imaging and exam decision was made to proceed with advanced imaging including MRI of the finger and dynamic US of the flexor and extensor tendons. Depending on image results will determine next steps in treatment. For the time being patient was sent Mobic 7.5 mg to take daily with food, if she tolerates this well she may increase to 15 mg daily. Also advised use of Voltaren gel and heat     The patient verbalized understanding of exam findings and treatment plan. We engaged in the shared decision-making process and treatment options were discussed at length with the patient. Surgical and conservative management discussed today along with risks and benefits.    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    To Do Next  Visit:  Re-evaluation of current issue      ____________________________________________________________________________________________________________________________________________      CHIEF COMPLAINT:  Chief Complaint   Patient presents with    Follow-up     Patient had an injection on 2/28/24, she did not have relief from this injection would like to talk about next steps       SUBJECTIVE:  Paola Story is a 58 y.o. year old RHD female who presents today for follow up evaluation of left ring finger pain and swelling. At last visit she underwent a steroid injection to the left ring MP joint which did not help significantly with pain but did reduce some of the swelling. She continues to note pain with flexion and using the finger to reach, such as when typing and playing her guitar. Pain is dorsal and volar, not associated with any clicking or locking. No paresthesias. No trauma but possible overuse injury      I have personally reviewed all the relevant PMH, PSH, SH, FH, Medications and allergies      PAST MEDICAL HISTORY:  Past Medical History:   Diagnosis Date    H/O amenorrhea     H/O menorrhagia     History of IBS     Hx of acne     Hypertension        PAST SURGICAL HISTORY:  Past Surgical History:   Procedure Laterality Date    BREAST EXCISIONAL BIOPSY Right 2001    benign    CHOLECYSTECTOMY      DILATION AND CURETTAGE OF UTERUS      ENDOMETRIAL BIOPSY      GALLBLADDER SURGERY         FAMILY HISTORY:  Family History   Problem Relation Age of Onset    No Known Problems Mother     No Known Problems Father     No Known Problems Sister     Colon cancer Maternal Grandmother 78    No Known Problems Maternal Grandfather     No Known Problems Paternal Grandmother     No Known Problems Paternal Grandfather     No Known Problems Brother     No Known Problems Son     No Known Problems Son     No Known Problems Son     No Known Problems Maternal Aunt     No Known Problems Maternal Aunt     Breast cancer Neg Hx         SOCIAL HISTORY:  Social History     Tobacco Use    Smoking status: Former     Current packs/day: 0.00     Types: Cigarettes     Quit date:      Years since quittin.2    Smokeless tobacco: Never   Vaping Use    Vaping status: Never Used   Substance Use Topics    Alcohol use: Yes     Comment: 1 per week    Drug use: No       MEDICATIONS:    Current Outpatient Medications:     Calcium Citrate-Vitamin D (CALCIUM + D PO), Take by mouth in the morning, Disp: , Rfl:     fluticasone (FLONASE) 50 mcg/act nasal spray, 2 sprays into each nostril Daily, Disp: , Rfl:     losartan (COZAAR) 50 mg tablet, Take 1 tablet by mouth daily, Disp: , Rfl:     Magnesium 250 MG TABS, Take by mouth in the morning, Disp: , Rfl:     meloxicam (Mobic) 7.5 mg tablet, Take 1 tablet (7.5 mg total) by mouth daily, Disp: 40 tablet, Rfl: 1    naproxen (NAPROSYN) 500 mg tablet, Take 1 tablet by mouth 2 (two) times a day with meals (Patient not taking: Reported on 2024), Disp: , Rfl:     ALLERGIES:  Allergies   Allergen Reactions    Penicillins Rash           REVIEW OF SYSTEMS:  Review of Systems   Constitutional:  Negative for chills, fever and unexpected weight change.   HENT:  Negative for hearing loss, nosebleeds and sore throat.    Eyes:  Negative for pain, redness and visual disturbance.   Respiratory:  Negative for cough, shortness of breath and wheezing.    Cardiovascular:  Negative for chest pain, palpitations and leg swelling.   Gastrointestinal:  Negative for abdominal pain and nausea.   Genitourinary:  Negative for dyspareunia, dysuria and frequency.   Musculoskeletal:  Positive for arthralgias and joint swelling.   Skin:  Negative for rash and wound.   Neurological:  Negative for dizziness, numbness and headaches.   Psychiatric/Behavioral:  Negative for decreased concentration and suicidal ideas. The patient is not nervous/anxious.        VITALS:  Vitals:    24 0926   BP: 159/84   Pulse: 68       LABS:  HgA1c: No  "results found for: \"HGBA1C\"  BMP:   Lab Results   Component Value Date    CALCIUM 8.8 12/02/2023    K 4.2 12/02/2023    CO2 29 12/02/2023     12/02/2023    BUN 18 12/02/2023    CREATININE 0.88 12/02/2023       _____________________________________________________  PHYSICAL EXAMINATION:  General: well developed and well nourished, alert, oriented times 3, and appears comfortable  Psychiatric: Normal  HEENT: Normocephalic, Atraumatic Trachea Midline, No torticollis  Pulmonary: No audible wheezing or respiratory distress   Abdomen/GI: Non tender, non distended   Cardiovascular: No pitting edema, 2+ radial pulse   Skin: No masses, erythema, lacerations, fluctation, ulcerations  Neurovascular: Sensation Intact to the Median, Ulnar, Radial Nerve, Motor Intact to the Median, Ulnar, Radial Nerve, and Pulses Intact  Musculoskeletal: Normal, except as noted in detailed exam and in HPI.      MUSCULOSKELETAL EXAMINATION:    Left ring:    Moderate swelling over the ring finger on the dorsal and volar aspects of the digit   Pain with flexion at the MP joint  Tenderness over dorsal and volar tendons as well as joint line   Difficulty with full composite fist secondary to pain and swelling  No palpable clicking or locking  No obvious extensor tendon subluxation   Sensation intact to light touch   Brisk capillary refill  ___________________________________________________  STUDIES REVIEWED:  I have personally reviewed AP lateral and oblique radiographs of left hand 3 views from 1/22/2024  which demonstrate mild degenerative changes MP joint ring finger but no acute injury or obvious abnormality       PROCEDURES PERFORMED:  Procedures  No procedure today  _____________________________________________________        Mirta Barbour         "

## 2024-04-01 ENCOUNTER — HOSPITAL ENCOUNTER (OUTPATIENT)
Dept: ULTRASOUND IMAGING | Facility: HOSPITAL | Age: 59
Discharge: HOME | End: 2024-04-01
Payer: COMMERCIAL

## 2024-04-01 ENCOUNTER — HOSPITAL ENCOUNTER (OUTPATIENT)
Dept: MRI IMAGING | Facility: HOSPITAL | Age: 59
Discharge: HOME | End: 2024-04-01
Payer: COMMERCIAL

## 2024-04-01 DIAGNOSIS — M77.8 TENDONITIS OF FINGER: ICD-10-CM

## 2024-04-01 DIAGNOSIS — M65.9 SYNOVITIS OF LEFT HAND: ICD-10-CM

## 2024-04-01 DIAGNOSIS — M19.042: ICD-10-CM

## 2024-04-01 PROCEDURE — 76882 US LMTD JT/FCL EVL NVASC XTR: CPT

## 2024-04-01 PROCEDURE — 73218 MRI UPPER EXTREMITY W/O DYE: CPT

## 2024-04-17 ENCOUNTER — OFFICE VISIT (OUTPATIENT)
Dept: OBGYN CLINIC | Facility: CLINIC | Age: 59
End: 2024-04-17
Payer: COMMERCIAL

## 2024-04-17 VITALS
WEIGHT: 180 LBS | HEART RATE: 65 BPM | DIASTOLIC BLOOD PRESSURE: 89 MMHG | HEIGHT: 60 IN | BODY MASS INDEX: 35.34 KG/M2 | SYSTOLIC BLOOD PRESSURE: 167 MMHG

## 2024-04-17 DIAGNOSIS — S60.042A CONTUSION OF LEFT RING FINGER WITHOUT DAMAGE TO NAIL, INITIAL ENCOUNTER: ICD-10-CM

## 2024-04-17 DIAGNOSIS — S60.222A CONTUSION OF LEFT HAND, INITIAL ENCOUNTER: Primary | ICD-10-CM

## 2024-04-17 PROCEDURE — 99213 OFFICE O/P EST LOW 20 MIN: CPT | Performed by: SURGERY

## 2024-04-17 NOTE — PROGRESS NOTES
ASSESSMENT/PLAN:      58 y.o. female with left ring finger contusion/stress reaction    MRI and ultrasound results were reviewed, 4th metacarpal and proximal phalanx contusion/stress reaction and UCL edema without tear. Overall Paola is doing well at this time and symptoms have resolved approx. 95 percent. She may wean from the Mobic. Advised heat can be beneficial and she should keep her hand moving. I do not believe a repeat MRI would be warranted in 3-6 months unless symptoms reoccur or fail to improve. We discussed the possibility of inflammatory work up as well if symptoms reoccur or fail to improve. I will see her back in the office on an as needed basis.      The patient verbalized understanding of exam findings and treatment plan. We engaged in the shared decision-making process and treatment options were discussed at length with the patient. Surgical and conservative management discussed today along with risks and benefits.    Diagnoses and all orders for this visit:    Contusion of left hand, initial encounter    Contusion of left ring finger without damage to nail, initial encounter      Follow Up:  Return if symptoms worsen or fail to improve.      To Do Next Visit:  Re-evaluation of current issue    ____________________________________________________________________________________________________________________________________________      CHIEF COMPLAINT:  Chief Complaint   Patient presents with    Follow-up     MRI review       SUBJECTIVE:  Paola Story is a 58 y.o. year old RHD female who presents to the office for a follow up regarding her left ring finger. She is here today to review MRI and ultrasound results. Overall Paola is doing well. She feels symptoms improved approx. 95 percent. She denies any specific injury or trauma to the hand but did increase guitar play when symptoms started. She is taking Mobic for pain control.        I have personally reviewed all the relevant PMH, PSH, SH,  FH, Medications and allergies.     PAST MEDICAL HISTORY:  Past Medical History:   Diagnosis Date    H/O amenorrhea     H/O menorrhagia     History of IBS     Hx of acne     Hypertension        PAST SURGICAL HISTORY:  Past Surgical History:   Procedure Laterality Date    BREAST EXCISIONAL BIOPSY Right 2001    benign    CHOLECYSTECTOMY      DILATION AND CURETTAGE OF UTERUS      ENDOMETRIAL BIOPSY      GALLBLADDER SURGERY         FAMILY HISTORY:  Family History   Problem Relation Age of Onset    No Known Problems Mother     No Known Problems Father     No Known Problems Sister     Colon cancer Maternal Grandmother 78    No Known Problems Maternal Grandfather     No Known Problems Paternal Grandmother     No Known Problems Paternal Grandfather     No Known Problems Brother     No Known Problems Son     No Known Problems Son     No Known Problems Son     No Known Problems Maternal Aunt     No Known Problems Maternal Aunt     Breast cancer Neg Hx        SOCIAL HISTORY:  Social History     Tobacco Use    Smoking status: Former     Current packs/day: 0.00     Types: Cigarettes     Quit date:      Years since quittin.3    Smokeless tobacco: Never   Vaping Use    Vaping status: Never Used   Substance Use Topics    Alcohol use: Yes     Comment: 1 per week    Drug use: No       MEDICATIONS:    Current Outpatient Medications:     Calcium Citrate-Vitamin D (CALCIUM + D PO), Take by mouth in the morning, Disp: , Rfl:     fluticasone (FLONASE) 50 mcg/act nasal spray, 2 sprays into each nostril Daily, Disp: , Rfl:     losartan (COZAAR) 50 mg tablet, Take 1 tablet by mouth daily, Disp: , Rfl:     Magnesium 250 MG TABS, Take by mouth in the morning, Disp: , Rfl:     meloxicam (Mobic) 7.5 mg tablet, Take 1 tablet (7.5 mg total) by mouth daily, Disp: 40 tablet, Rfl: 1    ALLERGIES:  Allergies   Allergen Reactions    Penicillins Rash       REVIEW OF SYSTEMS:  Review of Systems   Constitutional:  Negative for chills, fever and  unexpected weight change.   HENT:  Negative for hearing loss, nosebleeds and sore throat.    Eyes:  Negative for pain, redness and visual disturbance.   Respiratory:  Negative for cough, shortness of breath and wheezing.    Cardiovascular:  Negative for chest pain, palpitations and leg swelling.   Gastrointestinal:  Negative for abdominal pain, nausea and vomiting.   Endocrine: Negative for polydipsia and polyuria.   Genitourinary:  Negative for difficulty urinating and hematuria.   Musculoskeletal:  Negative for arthralgias, joint swelling and myalgias.   Skin:  Negative for rash and wound.   Neurological:  Negative for dizziness, numbness and headaches.   Psychiatric/Behavioral:  Negative for decreased concentration, dysphoric mood and suicidal ideas. The patient is not nervous/anxious.        VITALS:  Vitals:    04/17/24 1034   BP: 167/89   Pulse: 65         _____________________________________________________  PHYSICAL EXAMINATION:  General: well developed and well nourished, alert, oriented times 3, and appears comfortable  Psychiatric: Normal  HEENT: Normocephalic, Atraumatic Trachea Midline, No torticollis  Pulmonary: No audible wheezing or respiratory distress   Cardiovascular: No pitting edema, 2+ radial pulse   Abdominal/GI: abdomen non tender, non distended   Skin: No masses, erythema, lacerations, fluctation, ulcerations  Neurovascular: Sensation Intact to the Median, Ulnar, Radial Nerve, Motor Intact to the Median, Ulnar, Radial Nerve, and Pulses Intact  Musculoskeletal: Normal, except as noted in detailed exam and in HPI.      MUSCULOSKELETAL EXAMINATION:    Left ring finger:     No erythema, ecchymosis or significant edema  Mild metacarpal tenderness   Full digital extension   Full composite fist   ___________________________________________________    STUDIES REVIEWED:  I personally reviewed MRI of left ring finger which demonstrates no acute fracture dislocation does appear to have a stress  "reaction in the metacarpal head and the proximal phalanx base and slight edema and the ulnar collateral ligament otherwise unremarkable    I have personally reviewed and interpreted  US of left ring finger which demonstrates no sagittal band injury without any tendon subluxation    LABS REVIEWED:    HgA1c: No results found for: \"HGBA1C\"  BMP:   Lab Results   Component Value Date    CALCIUM 8.8 12/02/2023    K 4.2 12/02/2023    CO2 29 12/02/2023     12/02/2023    BUN 18 12/02/2023    CREATININE 0.88 12/02/2023             PROCEDURES PERFORMED:  Procedures  No Procedures performed today    _____________________________________________________      Scribe Attestation      I,:  Margi Maurer am acting as a scribe while in the presence of the attending physician.:       I,:  Calvin Guillen MD personally performed the services described in this documentation    as scribed in my presence.:                 "

## 2024-05-31 DIAGNOSIS — M77.8 TENDONITIS OF FINGER: ICD-10-CM

## 2024-05-31 DIAGNOSIS — M65.9 SYNOVITIS OF LEFT HAND: ICD-10-CM

## 2024-05-31 DIAGNOSIS — M19.042: ICD-10-CM

## 2024-05-31 RX ORDER — MELOXICAM 7.5 MG/1
7.5 TABLET ORAL DAILY
Qty: 90 TABLET | Refills: 1 | Status: SHIPPED | OUTPATIENT
Start: 2024-05-31

## 2024-08-28 ENCOUNTER — ANNUAL EXAM (OUTPATIENT)
Dept: OBGYN CLINIC | Facility: CLINIC | Age: 59
End: 2024-08-28
Payer: COMMERCIAL

## 2024-08-28 VITALS
WEIGHT: 182.6 LBS | SYSTOLIC BLOOD PRESSURE: 132 MMHG | HEIGHT: 61 IN | BODY MASS INDEX: 34.48 KG/M2 | DIASTOLIC BLOOD PRESSURE: 92 MMHG

## 2024-08-28 DIAGNOSIS — N95.2 VAGINAL ATROPHY: ICD-10-CM

## 2024-08-28 DIAGNOSIS — Z01.419 ROUTINE GYNECOLOGICAL EXAMINATION: Primary | ICD-10-CM

## 2024-08-28 PROCEDURE — 99396 PREV VISIT EST AGE 40-64: CPT | Performed by: PHYSICIAN ASSISTANT

## 2024-08-28 RX ORDER — ESTRADIOL 0.1 MG/G
CREAM VAGINAL
Qty: 42.5 G | Refills: 3 | Status: SHIPPED | OUTPATIENT
Start: 2024-08-28

## 2024-08-28 NOTE — PROGRESS NOTES
Assessment   59 y.o. postmenopausal female presenting for annual exam.     Plan   Diagnoses and all orders for this visit:    Routine gynecological examination  Normal findings on routine exam.  Encouraged 150 min of exercise per week.  Reviewed balanced diet.   Multivitamin encouraged   Breast awareness/SBE encouraged     Vaginal atrophy  -     estradiol (ESTRACE VAGINAL) 0.1 mg/g vaginal cream; 2g intravaginally at bedtime for one week, then 1g intravaginally 2 times per week    Reviewed options to reduce pain and dryness. Suspect this continues to be cause for intermittent light bleeding following IC. 2mm endometrial stripe on US last year for the same  To begin estrace.  Discussed lubricant options and additional moisturizers OTC.  Plan for f/u in 3months for recheck, sooner with concerns/worsening.  Precautions given.       Pap - due   Mammo scheduled    Colonoscopy due        RTO one year for yearly exam or sooner as needed.      __________________________________________________________________      Subjective     Paola Story is a 59 y.o. postmenopausal female presenting for annual exam.     SCREENING  Last Pap: 2022 NILM/hpv neg  Last Mammo: 10/14/2023 BIRADS 1 - Negative  Last Colonoscopy: 2023 5 year recall   Last DEXA: n/a     GYN  Denies postmenopausal vaginal bleeding, dryness, vaginal discharge, itching, odor, pelvic pain and vulvar/vaginal symptoms    Sexually active: with her    Concerns: continues with dryness. Occasional light bleeding and soreness after IC. Workup last year neg -  US showing lining 2 mm. Has not begun using any moisturizers or lubricant due to fear of increasing UTI frequency.     Hx Abnormal pap: denies  We reviewed ASCCP guidelines for Pap testing today.       OB        Complaints: denies  Denies leakage/difficulty urinating, dysuria, hematuria, and urinary frequency/urgency    BREAST  Complaints: denies  Denies: breast lump, breast  tenderness, dryness, nipple discharge, pruritus, skin color change, and skin lesion(s)    Pertinent Family Hx:   Family hx of breast cancer: no  Family hx of ovarian cancer: no  Family hx of colon cancer: MGM      Past Medical History:   Diagnosis Date    H/O amenorrhea     H/O menorrhagia     History of IBS     Hx of acne     Hypertension        Past Surgical History:   Procedure Laterality Date    BREAST EXCISIONAL BIOPSY Right 2001    benign    CHOLECYSTECTOMY      DILATION AND CURETTAGE OF UTERUS      ENDOMETRIAL BIOPSY      GALLBLADDER SURGERY           Current Outpatient Medications:     Calcium Citrate-Vitamin D (CALCIUM + D PO), Take by mouth in the morning, Disp: , Rfl:     estradiol (ESTRACE VAGINAL) 0.1 mg/g vaginal cream, 2g intravaginally at bedtime for one week, then 1g intravaginally 2 times per week, Disp: 42.5 g, Rfl: 3    fluticasone (FLONASE) 50 mcg/act nasal spray, 2 sprays into each nostril Daily, Disp: , Rfl:     losartan (COZAAR) 50 mg tablet, Take 1 tablet by mouth daily, Disp: , Rfl:     Magnesium 250 MG TABS, Take by mouth in the morning, Disp: , Rfl:     meloxicam (MOBIC) 7.5 mg tablet, TAKE ONE TABLET BY MOUTH EVERY DAY, Disp: 90 tablet, Rfl: 1    Allergies   Allergen Reactions    Penicillins Rash       Social History     Socioeconomic History    Marital status: /Civil Union     Spouse name: Not on file    Number of children: Not on file    Years of education: Not on file    Highest education level: Not on file   Occupational History    Not on file   Tobacco Use    Smoking status: Former     Current packs/day: 0.00     Types: Cigarettes     Quit date:      Years since quittin.6    Smokeless tobacco: Never   Vaping Use    Vaping status: Never Used   Substance and Sexual Activity    Alcohol use: Yes     Comment: 1 per week    Drug use: No    Sexual activity: Yes     Partners: Male     Birth control/protection: Post-menopausal   Other Topics Concern    Not on file   Social  "History Narrative    Not on file     Social Determinants of Health     Financial Resource Strain: Not on file   Food Insecurity: Not on file   Transportation Needs: Not on file   Physical Activity: Not on file   Stress: Not on file   Social Connections: Not on file   Intimate Partner Violence: Not on file   Housing Stability: Not on file         Review of Systems   Constitutional: Negative.   Respiratory: Negative.    Cardiovascular: Negative   Gastrointestinal: Negative   Breasts: As noted above.   Genitourinary: As noted above.   Psychiatric: Negative       Objective      /92 (BP Location: Left arm, Patient Position: Sitting, Cuff Size: Large)   Ht 5' 1\" (1.549 m)   Wt 82.8 kg (182 lb 9.6 oz)   LMP  (LMP Unknown)   BMI 34.50 kg/m²     Physical Examination:  Patient appears well and is not in distress  Breasts are symmetrical without mass, tenderness, nipple discharge, skin changes or adenopathy.   Abdomen is soft and nontender without masses.   External genitals are normal without lesions or rashes.  Urethral meatus and urethra are normal  Bladder is normal to palpation  Vagina is normal without discharge or bleeding. Very mild atrophic changes.   Cervix is normal without discharge or lesion.   Uterus is normal, mobile, nontender without palpable mass.  Adnexa are normal, nontender, without palpable mass.     "

## 2024-09-05 ENCOUNTER — OFFICE VISIT (OUTPATIENT)
Dept: OBGYN CLINIC | Facility: CLINIC | Age: 59
End: 2024-09-05
Payer: COMMERCIAL

## 2024-09-05 ENCOUNTER — HOSPITAL ENCOUNTER (OUTPATIENT)
Dept: RADIOLOGY | Facility: HOSPITAL | Age: 59
End: 2024-09-05
Payer: COMMERCIAL

## 2024-09-05 ENCOUNTER — APPOINTMENT (OUTPATIENT)
Dept: LAB | Facility: CLINIC | Age: 59
End: 2024-09-05
Payer: COMMERCIAL

## 2024-09-05 VITALS — WEIGHT: 182 LBS | BODY MASS INDEX: 34.36 KG/M2 | OXYGEN SATURATION: 97 % | HEIGHT: 61 IN | HEART RATE: 99 BPM

## 2024-09-05 DIAGNOSIS — R22.31 LOCALIZED SWELLING OF RIGHT THUMB: ICD-10-CM

## 2024-09-05 DIAGNOSIS — M79.644 FINGER PAIN, RIGHT: Primary | ICD-10-CM

## 2024-09-05 DIAGNOSIS — M79.644 FINGER PAIN, RIGHT: ICD-10-CM

## 2024-09-05 LAB
BASOPHILS # BLD AUTO: 0.03 THOUSANDS/ÂΜL (ref 0–0.1)
BASOPHILS NFR BLD AUTO: 1 % (ref 0–1)
CRP SERPL QL: 9.3 MG/L
EOSINOPHIL # BLD AUTO: 0.05 THOUSAND/ÂΜL (ref 0–0.61)
EOSINOPHIL NFR BLD AUTO: 1 % (ref 0–6)
ERYTHROCYTE [DISTWIDTH] IN BLOOD BY AUTOMATED COUNT: 13.2 % (ref 11.6–15.1)
ERYTHROCYTE [SEDIMENTATION RATE] IN BLOOD: 38 MM/HOUR (ref 0–29)
HCT VFR BLD AUTO: 39.3 % (ref 34.8–46.1)
HGB BLD-MCNC: 12.9 G/DL (ref 11.5–15.4)
IMM GRANULOCYTES # BLD AUTO: 0.02 THOUSAND/UL (ref 0–0.2)
IMM GRANULOCYTES NFR BLD AUTO: 0 % (ref 0–2)
LYMPHOCYTES # BLD AUTO: 0.93 THOUSANDS/ÂΜL (ref 0.6–4.47)
LYMPHOCYTES NFR BLD AUTO: 17 % (ref 14–44)
MCH RBC QN AUTO: 30 PG (ref 26.8–34.3)
MCHC RBC AUTO-ENTMCNC: 32.8 G/DL (ref 31.4–37.4)
MCV RBC AUTO: 91 FL (ref 82–98)
MONOCYTES # BLD AUTO: 0.35 THOUSAND/ÂΜL (ref 0.17–1.22)
MONOCYTES NFR BLD AUTO: 6 % (ref 4–12)
NEUTROPHILS # BLD AUTO: 4.06 THOUSANDS/ÂΜL (ref 1.85–7.62)
NEUTS SEG NFR BLD AUTO: 75 % (ref 43–75)
NRBC BLD AUTO-RTO: 0 /100 WBCS
PLATELET # BLD AUTO: 207 THOUSANDS/UL (ref 149–390)
PMV BLD AUTO: 9.5 FL (ref 8.9–12.7)
RBC # BLD AUTO: 4.3 MILLION/UL (ref 3.81–5.12)
URATE SERPL-MCNC: 4.3 MG/DL (ref 2–7.5)
WBC # BLD AUTO: 5.44 THOUSAND/UL (ref 4.31–10.16)

## 2024-09-05 PROCEDURE — 36415 COLL VENOUS BLD VENIPUNCTURE: CPT

## 2024-09-05 PROCEDURE — 85025 COMPLETE CBC W/AUTO DIFF WBC: CPT

## 2024-09-05 PROCEDURE — 85652 RBC SED RATE AUTOMATED: CPT

## 2024-09-05 PROCEDURE — 84550 ASSAY OF BLOOD/URIC ACID: CPT

## 2024-09-05 PROCEDURE — 73140 X-RAY EXAM OF FINGER(S): CPT

## 2024-09-05 PROCEDURE — 99214 OFFICE O/P EST MOD 30 MIN: CPT | Performed by: PHYSICIAN ASSISTANT

## 2024-09-05 PROCEDURE — 86140 C-REACTIVE PROTEIN: CPT

## 2024-09-05 NOTE — PROGRESS NOTES
ASSESSMENT/PLAN:    Assessment:   Right thumb redness and swelling, 1mo   - Gout flare, infection in the differential    Plan:   Labwork: CBC, ESR, CRP, Uric acid    Follow Up:  Dr. Guillen            _____________________________________________________  CHIEF COMPLAINT:  Chief Complaint   Patient presents with    Right Thumb - Pain, Swelling     1 month- no injury. Redness, swelling, hot to touch. No known cuts or bug bites. No hx of gout or cellulitus         SUBJECTIVE:  Paola Story is a 59 y.o. female who presents with swelling and erythema of the right thumb.  This started  1 month(s) ago: There was no specific injury.  The symptoms started over night.  No fevers or chills.  She does not have pain when using the thumb, but does have pain with any direct touch.  It is difficult for her to play her oboe which rests right on that spot, but she is fine playing her guitar. She has noticed stiffness in the IP joint.  She reports she had similar symptoms in the left ring finger MCP joint earlier this year and and MRI showed a stress reaction.  Dr. Guillen treated this with a cortisone injection and it feels fine now.  The patient thinks the thumb is something different because she has no reason to have an overuse injury there.  Radiation: None  Previous Treatments: None   Associated symptoms: Stiffness/LROM  Handedness: right  Work status: wedding     PAST MEDICAL HISTORY:  Past Medical History:   Diagnosis Date    H/O amenorrhea     H/O menorrhagia     History of IBS     Hx of acne     Hypertension        PAST SURGICAL HISTORY:  Past Surgical History:   Procedure Laterality Date    BREAST EXCISIONAL BIOPSY Right 2001    benign    CHOLECYSTECTOMY      DILATION AND CURETTAGE OF UTERUS      ENDOMETRIAL BIOPSY      GALLBLADDER SURGERY         FAMILY HISTORY:  Family History   Problem Relation Age of Onset    No Known Problems Mother     No Known Problems Father     No Known Problems Sister     Colon  "cancer Maternal Grandmother 78    No Known Problems Maternal Grandfather     No Known Problems Paternal Grandmother     No Known Problems Paternal Grandfather     No Known Problems Brother     No Known Problems Son     No Known Problems Son     No Known Problems Son     No Known Problems Maternal Aunt     No Known Problems Maternal Aunt     Breast cancer Neg Hx        SOCIAL HISTORY:  Social History     Tobacco Use    Smoking status: Former     Current packs/day: 0.00     Types: Cigarettes     Quit date:      Years since quittin.7    Smokeless tobacco: Never   Vaping Use    Vaping status: Never Used   Substance Use Topics    Alcohol use: Yes     Comment: 1 per week    Drug use: No       MEDICATIONS:    Current Outpatient Medications:     Calcium Citrate-Vitamin D (CALCIUM + D PO), Take by mouth in the morning, Disp: , Rfl:     estradiol (ESTRACE VAGINAL) 0.1 mg/g vaginal cream, 2g intravaginally at bedtime for one week, then 1g intravaginally 2 times per week, Disp: 42.5 g, Rfl: 3    fluticasone (FLONASE) 50 mcg/act nasal spray, 2 sprays into each nostril Daily, Disp: , Rfl:     losartan (COZAAR) 50 mg tablet, Take 1 tablet by mouth daily, Disp: , Rfl:     Magnesium 250 MG TABS, Take by mouth in the morning, Disp: , Rfl:     meloxicam (MOBIC) 7.5 mg tablet, TAKE ONE TABLET BY MOUTH EVERY DAY, Disp: 90 tablet, Rfl: 1    ALLERGIES:  Allergies   Allergen Reactions    Penicillins Rash       REVIEW OF SYSTEMS:  Pertinent items are noted in HPI.  A comprehensive review of systems was negative.    LABS:  HgA1c: No results found for: \"HGBA1C\"  BMP:   Lab Results   Component Value Date    CALCIUM 8.8 2023    K 4.2 2023    CO2 29 2023     2023    BUN 18 2023    CREATININE 0.88 2023         _____________________________________________________  PHYSICAL EXAMINATION:  Vital signs: Pulse 99   Ht 5' 1\" (1.549 m)   Wt 82.6 kg (182 lb)   LMP  (LMP Unknown)   SpO2 97%   BMI " 34.39 kg/m²   General: well developed and well nourished, alert, oriented times 3, and appears comfortable  Psychiatric: Normal  HEENT: Trachea Midline, No torticollis  Cardiovascular: Regular rate and rhythm  Pulmonary: No audible wheezing   Abdomen: No guarding  Extremities: No lymphedema  Skin: No masses, erythema, lacerations, fluctation, ulcerations  Neurovascular: Sensation Intact to the Median, Ulnar, Radial Nerve, Motor Intact to the Median, Ulnar, Radial Nerve, and Pulses Intact    MUSCULOSKELETAL EXAMINATION:  RIGHT SIDE:  thumb: Moderate swelling of the IP joint area.  Mild erythema. 0-20 IP ROM. No lateral laxity. + point tenderness on the ulnar side of the IP joint.  No open skin. NVI distally.        _____________________________________________________  STUDIES REVIEWED:  Xray: 3 views of the right thumb were done in the office today.  Images reviewed.  Radiologist reading pending.  No acute displaced fracture or severe osteoarthritis.        PROCEDURES PERFORMED:  Procedures  No Procedures performed today

## 2024-09-06 ENCOUNTER — TELEPHONE (OUTPATIENT)
Age: 59
End: 2024-09-06

## 2024-09-06 DIAGNOSIS — R22.31 LOCALIZED SWELLING OF RIGHT THUMB: Primary | ICD-10-CM

## 2024-09-06 RX ORDER — CEFADROXIL 500 MG/1
500 CAPSULE ORAL EVERY 12 HOURS SCHEDULED
Qty: 14 CAPSULE | Refills: 0 | Status: SHIPPED | OUTPATIENT
Start: 2024-09-06 | End: 2024-09-13

## 2024-09-06 NOTE — TELEPHONE ENCOUNTER
Her blood work does reveal an elevated ESR and CRP which are nonspecific markers of inflammation.  Her CBC did not reveal an elevated white blood cell count which is more indicative of active infection.  Her uric acid level was normal although on occasion uric acid may be normal during an acute gout flare.    However as a prophylactic measure I will prescribe an oral antibiotic.  A 7-day course of Duricef was sent to patient's pharmacy electronically.

## 2024-09-06 NOTE — TELEPHONE ENCOUNTER
Caller: Patient    Doctor: Jay Arboleda    Reason for call: Patient is calling stating she had bloodwork done yesterday and she saw the results and she states it indicates infection. She would like medication sent into her [facility] pharmacy or if something else is recommended    Call back#: [phone]

## 2024-09-06 NOTE — TELEPHONE ENCOUNTER
Called and spoke w/pt and relayed SHYAM Underwood's msg.  She states that her normal WBCs are low and according to labs are 4.0 or less.  Informed her that SHYAM Underwood would be able to see these values as well as the ESR and CRP  results and prescribed an AB.  She should discuss her concerns for low WBC w/her PCP, which she says she has and she was not concerned.  No further questions/concerns.

## 2024-09-12 ENCOUNTER — TELEPHONE (OUTPATIENT)
Dept: OBGYN CLINIC | Facility: HOSPITAL | Age: 59
End: 2024-09-12

## 2024-09-12 NOTE — TELEPHONE ENCOUNTER
Caller: Paola    Doctor: Jay Arboleda    Reason for call: Patient is calling in due to she has two antibiotic pills left. She explained in the beginning of treatment with the antibiotic it seemed to help, with the swelling and pain, and went down a lot. Now it has returned a little less than it was but its hot to touch, purplish look like before and painful.  Patient is wondering if this could be gout, should she get a different antibiotic, or come in for re exam, or try another round of the one she is on? She is not sure what to do.     Call back#: [phone]

## 2024-09-12 NOTE — TELEPHONE ENCOUNTER
Called and spoke w/pt and she would like be seen at [place]. Did not want to go to [facility].  Soonest available appt was with Dr Ramey on 9/20/24 at 1000 at [facility].  Aware that she has seen Dr Guillen (she was booked at [place])  in the past and is willing to see Dr Ramey.      Jay pt would like to know, if she needs to be on AB longer or if she needs a sooner appt?  Please review and advise.

## 2024-09-12 NOTE — TELEPHONE ENCOUNTER
Called and spoke w/pt and she states taht the Duricef that was prescribed on 9/6/24 has not improved her right thumb.  She is on Day 6 and has 1 more day of AB. The swelling has gone down; however, the right thumb is still painful, hot, red, purplish, stiff and has some swelling. She is able to reach the 3rd pad area of the pinky with the thumb. She had a fever on Sunday to Tuesday.  She will send a picture to my work email at [email or web address] and I will forward when received.

## 2024-09-12 NOTE — TELEPHONE ENCOUNTER
Pictures received via my email at [email or web address]:

## 2024-09-20 ENCOUNTER — TELEPHONE (OUTPATIENT)
Age: 59
End: 2024-09-20

## 2024-09-20 ENCOUNTER — OFFICE VISIT (OUTPATIENT)
Dept: OBGYN CLINIC | Facility: CLINIC | Age: 59
End: 2024-09-20
Payer: COMMERCIAL

## 2024-09-20 VITALS — HEIGHT: 61 IN | BODY MASS INDEX: 34.81 KG/M2 | WEIGHT: 184.4 LBS

## 2024-09-20 DIAGNOSIS — M25.441 FINGER JOINT SWELLING, RIGHT: Primary | ICD-10-CM

## 2024-09-20 PROCEDURE — 99214 OFFICE O/P EST MOD 30 MIN: CPT | Performed by: STUDENT IN AN ORGANIZED HEALTH CARE EDUCATION/TRAINING PROGRAM

## 2024-09-20 NOTE — PROGRESS NOTES
ORTHOPAEDIC HAND, WRIST, AND ELBOW OFFICE  VISIT      ASSESSMENT/PLAN:      Diagnoses and all orders for this visit:    Felon of finger          59 y.o. female with right thumb swelling possible low grade felon     Discussed with the patient on exam there is no obvious signs of drainable infection. Discussed this may be a low felon. We can consider in office I&D since this has been ongoing for the past 2 months and has not resolved with a course of antibiotics versus continuing to monitor. The patient elected to continue to monitor at this time. A one week prescription was provided for Bactrim. She will follow up Tuesday morning for repeat evaluation.    Follow Up:  Tuesday      To Do Next Visit:  Re-evaluation of current issue        Narciso Ramey MD  Attending, Orthopaedic Surgery  Hand, Wrist, and Elbow Surgery  [facility]    ______________________________________________________________________________________________    CHIEF COMPLAINT:  Chief Complaint   Patient presents with    Right Thumb - Pain       SUBJECTIVE:  Patient is a 59 y.o. RHD female who presents today for evaluation and treatment of right thumb pain. The patient notes redness and swelling to the right thumb. She was evaluated by Jay Arboleda on 9/5/24 where x-rays were taken. She was prescribed a week worth of Duricef which she completed as prescribed. She states the antibiotic did help some but did not resolve her symptoms.      Occupation:      I have personally reviewed all the relevant PMH, PSH, SH, FH, Medications and allergies      PAST MEDICAL HISTORY:  Past Medical History:   Diagnosis Date    H/O amenorrhea     H/O menorrhagia     History of IBS     Hx of acne     Hypertension        PAST SURGICAL HISTORY:  Past Surgical History:   Procedure Laterality Date    BREAST EXCISIONAL BIOPSY Right 2001    benign    CHOLECYSTECTOMY      DILATION AND CURETTAGE OF UTERUS      ENDOMETRIAL BIOPSY       "GALLBLADDER SURGERY         FAMILY HISTORY:  Family History   Problem Relation Age of Onset    No Known Problems Mother     No Known Problems Father     No Known Problems Sister     Colon cancer Maternal Grandmother 78    No Known Problems Maternal Grandfather     No Known Problems Paternal Grandmother     No Known Problems Paternal Grandfather     No Known Problems Brother     No Known Problems Son     No Known Problems Son     No Known Problems Son     No Known Problems Maternal Aunt     No Known Problems Maternal Aunt     Breast cancer Neg Hx        SOCIAL HISTORY:  Social History     Tobacco Use    Smoking status: Former     Current packs/day: 0.00     Types: Cigarettes     Quit date:      Years since quittin.    Smokeless tobacco: Never   Vaping Use    Vaping status: Never Used   Substance Use Topics    Alcohol use: Yes     Comment: 1 per week    Drug use: No       MEDICATIONS:    Current Outpatient Medications:     Calcium Citrate-Vitamin D (CALCIUM + D PO), Take by mouth in the morning, Disp: , Rfl:     estradiol (ESTRACE VAGINAL) 0.1 mg/g vaginal cream, 2g intravaginally at bedtime for one week, then 1g intravaginally 2 times per week, Disp: 42.5 g, Rfl: 3    fluticasone (FLONASE) 50 mcg/act nasal spray, 2 sprays into each nostril Daily, Disp: , Rfl:     losartan (COZAAR) 50 mg tablet, Take 1 tablet by mouth daily, Disp: , Rfl:     Magnesium 250 MG TABS, Take by mouth in the morning, Disp: , Rfl:     meloxicam (MOBIC) 7.5 mg tablet, TAKE ONE TABLET BY MOUTH EVERY DAY, Disp: 90 tablet, Rfl: 1    ALLERGIES:  Allergies   Allergen Reactions    Penicillins Rash           REVIEW OF SYSTEMS:  Musculoskeletal:        As noted in HPI.   All other systems reviewed and are negative.    VITALS:  There were no vitals filed for this visit.    LABS:  HgA1c: No results found for: \"HGBA1C\"  BMP:   Lab Results   Component Value Date    CALCIUM 8.8 2023    K 4.2 2023    CO2 29 2023     " 12/02/2023    BUN 18 12/02/2023    CREATININE 0.88 12/02/2023       _____________________________________________________  PHYSICAL EXAMINATION:  General: Well developed and well nourished, alert & oriented x 3, appears comfortable  Psychiatric: Normal  HEENT: Normocephalic, Atraumatic Trachea Midline, No torticollis  Pulmonary: No audible wheezing or respiratory distress   Abdomen/GI: Non tender, non distended   Cardiovascular: No pitting edema, 2+ radial pulse   Skin: No masses, erythema, lacerations, fluctation, ulcerations  Neurovascular: Sensation Intact to the Median, Ulnar, Radial Nerve, Motor Intact to the Median, Ulnar, Radial Nerve, and Pulses Intact  Musculoskeletal: Normal, except as noted in detailed exam and in HPI.      MUSCULOSKELETAL EXAMINATION:  Right thumb  IP flexion 45  No tenderness along the flexor tendon sheath  Moderate swelling swelling noted  Erythema noted to thumb pulp  No palpable fluctuance    Left thumb  IP flexion 65    ___________________________________________________  STUDIES REVIEWED:  Xrays of the right thumb were reviewed and independently interpreted in PACS by Dr. Ramey and demonstrate no osseous abnormalities.          PROCEDURES PERFORMED:  Procedures  No Procedures performed today    _____________________________________________________      Scribe Attestation      I,:  Jo-Ann Maurer MA am acting as a scribe while in the presence of the attending physician.:       I,:  Narciso Ramey MD personally performed the services described in this documentation    as scribed in my presence.:

## 2024-09-20 NOTE — TELEPHONE ENCOUNTER
Patient called the RX Refill Line. Message is being forwarded to the office.     Patient called to see if antiobiotic was sent over to the pharmacy. Advised it has not yet been sent. Please send RX.    Please contact patient at [phone]

## 2024-09-21 RX ORDER — SULFAMETHOXAZOLE/TRIMETHOPRIM 800-160 MG
1 TABLET ORAL EVERY 12 HOURS SCHEDULED
Qty: 14 TABLET | Refills: 0 | Status: ON HOLD | OUTPATIENT
Start: 2024-09-21 | End: 2024-09-25

## 2024-09-24 ENCOUNTER — PREP FOR PROCEDURE (OUTPATIENT)
Dept: OBGYN CLINIC | Facility: CLINIC | Age: 59
End: 2024-09-24

## 2024-09-24 ENCOUNTER — OFFICE VISIT (OUTPATIENT)
Dept: OBGYN CLINIC | Facility: CLINIC | Age: 59
End: 2024-09-24
Payer: COMMERCIAL

## 2024-09-24 VITALS
SYSTOLIC BLOOD PRESSURE: 130 MMHG | HEART RATE: 85 BPM | WEIGHT: 184 LBS | DIASTOLIC BLOOD PRESSURE: 74 MMHG | BODY MASS INDEX: 34.74 KG/M2 | HEIGHT: 61 IN

## 2024-09-24 DIAGNOSIS — L03.011 FELON OF FINGER OF RIGHT HAND: Primary | ICD-10-CM

## 2024-09-24 PROCEDURE — 99213 OFFICE O/P EST LOW 20 MIN: CPT | Performed by: STUDENT IN AN ORGANIZED HEALTH CARE EDUCATION/TRAINING PROGRAM

## 2024-09-24 NOTE — PROGRESS NOTES
ORTHOPAEDIC HAND, WRIST, AND ELBOW OFFICE  VISIT      ASSESSMENT/PLAN:      Diagnoses and all orders for this visit:    Felon of finger of right hand          59 y.o. female with right thumb swelling possible low grade felon    Since this has been ongoing for the past 2.5 months discussed with the patient it is unlikely this will improve. Discussed we can consider to continue to monitor versus I&D. The patient elected to proceed with surgical intervention in the form of right thumb I&D. Risks of the surgery are inclusive of but not limited to bleeding, infection, nerve injury, blood clot, worsening of symptoms, not achieving the anticipated results, persistent stiffness, weakness and the need for additional surgery. The patient verbally stated they understood those risks and would like to proceed with the surgery. Surgical consent was signed in the office today. I will see the patient the day of surgery.        Follow Up:  After surgery       To Do Next Visit:  Sutures out        Narciso Ramey MD  Attending, Orthopaedic Surgery  Hand, Wrist, and Elbow Surgery  [facility]    ______________________________________________________________________________________________    CHIEF COMPLAINT:  Chief Complaint   Patient presents with    Right Thumb - Pain, Swelling       SUBJECTIVE:  Patient is a 59 y.o. RHD female who presents today for follow up of right thumb swelling possible low grade felon. The patient states the antibiotic was not called in until late Friday night so she did not start it until Saturday. She states the swelling has gotten slightly better. She notes pain to the pulp.     Occupation:       I have personally reviewed all the relevant PMH, PSH, SH, FH, Medications and allergies      PAST MEDICAL HISTORY:  Past Medical History:   Diagnosis Date    H/O amenorrhea     H/O menorrhagia     History of IBS     Hx of acne     Hypertension        PAST SURGICAL  HISTORY:  Past Surgical History:   Procedure Laterality Date    BREAST EXCISIONAL BIOPSY Right     benign    CHOLECYSTECTOMY      DILATION AND CURETTAGE OF UTERUS      ENDOMETRIAL BIOPSY      GALLBLADDER SURGERY         FAMILY HISTORY:  Family History   Problem Relation Age of Onset    No Known Problems Mother     No Known Problems Father     No Known Problems Sister     Colon cancer Maternal Grandmother 78    No Known Problems Maternal Grandfather     No Known Problems Paternal Grandmother     No Known Problems Paternal Grandfather     No Known Problems Brother     No Known Problems Son     No Known Problems Son     No Known Problems Son     No Known Problems Maternal Aunt     No Known Problems Maternal Aunt     Breast cancer Neg Hx        SOCIAL HISTORY:  Social History     Tobacco Use    Smoking status: Former     Current packs/day: 0.00     Types: Cigarettes     Quit date:      Years since quittin.7    Smokeless tobacco: Never   Vaping Use    Vaping status: Never Used   Substance Use Topics    Alcohol use: Yes     Comment: 1 per week    Drug use: No       MEDICATIONS:    Current Outpatient Medications:     Calcium Citrate-Vitamin D (CALCIUM + D PO), Take by mouth in the morning, Disp: , Rfl:     estradiol (ESTRACE VAGINAL) 0.1 mg/g vaginal cream, 2g intravaginally at bedtime for one week, then 1g intravaginally 2 times per week, Disp: 42.5 g, Rfl: 3    fluticasone (FLONASE) 50 mcg/act nasal spray, 2 sprays into each nostril Daily, Disp: , Rfl:     losartan (COZAAR) 50 mg tablet, Take 1 tablet by mouth daily, Disp: , Rfl:     Magnesium 250 MG TABS, Take by mouth in the morning, Disp: , Rfl:     sulfamethoxazole-trimethoprim (BACTRIM DS) 800-160 mg per tablet, Take 1 tablet by mouth every 12 (twelve) hours for 7 days, Disp: 14 tablet, Rfl: 0    meloxicam (MOBIC) 7.5 mg tablet, TAKE ONE TABLET BY MOUTH EVERY DAY (Patient not taking: Reported on 2024), Disp: 90 tablet, Rfl:  "1    ALLERGIES:  Allergies   Allergen Reactions    Penicillins Rash           REVIEW OF SYSTEMS:  Musculoskeletal:        As noted in HPI.   All other systems reviewed and are negative.    VITALS:  Vitals:    09/24/24 0845   BP: 130/74   Pulse: 85       LABS:  HgA1c: No results found for: \"HGBA1C\"  BMP:   Lab Results   Component Value Date    CALCIUM 8.8 12/02/2023    K 4.2 12/02/2023    CO2 29 12/02/2023     12/02/2023    BUN 18 12/02/2023    CREATININE 0.88 12/02/2023       _____________________________________________________  PHYSICAL EXAMINATION:  General: Well developed and well nourished, alert & oriented x 3, appears comfortable  Psychiatric: Normal  HEENT: Normocephalic, Atraumatic Trachea Midline, No torticollis  Pulmonary: No audible wheezing or respiratory distress   Abdomen/GI: Non tender, non distended   Cardiovascular: No pitting edema, 2+ radial pulse   Skin: No Masses, No Lacerations, No Ulcerations  Neurovascular: Sensation Intact to the Median, Ulnar, Radial Nerve, Motor Intact to the Median, Ulnar, Radial Nerve, and Pulses Intact  Musculoskeletal: Normal, except as noted in detailed exam and in HPI.      MUSCULOSKELETAL EXAMINATION:  Right thumb  Erythema and swelling to the pulp of the thumb  Erythema and swelling with mild improvement compared to prior examination.  Non tender along the tendon sheath  No palpable fluctuance  No pain along flexor tendon sheath  ___________________________________________________  STUDIES REVIEWED:  No new studies to review         PROCEDURES PERFORMED:  Procedures  No Procedures performed today    _____________________________________________________      Scribe Attestation      I,:  Jo-Ann Maurer MA am acting as a scribe while in the presence of the attending physician.:       I,:  Narciso Ramey MD personally performed the services described in this documentation    as scribed in my presence.:           "

## 2024-09-24 NOTE — H&P (VIEW-ONLY)
ORTHOPAEDIC HAND, WRIST, AND ELBOW OFFICE  VISIT      ASSESSMENT/PLAN:      Diagnoses and all orders for this visit:    Felon of finger of right hand          59 y.o. female with right thumb swelling possible low grade felon    Since this has been ongoing for the past 2.5 months discussed with the patient it is unlikely this will improve. Discussed we can consider to continue to monitor versus I&D. The patient elected to proceed with surgical intervention in the form of right thumb I&D. Risks of the surgery are inclusive of but not limited to bleeding, infection, nerve injury, blood clot, worsening of symptoms, not achieving the anticipated results, persistent stiffness, weakness and the need for additional surgery. The patient verbally stated they understood those risks and would like to proceed with the surgery. Surgical consent was signed in the office today. I will see the patient the day of surgery.        Follow Up:  After surgery       To Do Next Visit:  Sutures out        Narciso Ramey MD  Attending, Orthopaedic Surgery  Hand, Wrist, and Elbow Surgery  [facility]    ______________________________________________________________________________________________    CHIEF COMPLAINT:  Chief Complaint   Patient presents with    Right Thumb - Pain, Swelling       SUBJECTIVE:  Patient is a 59 y.o. RHD female who presents today for follow up of right thumb swelling possible low grade felon. The patient states the antibiotic was not called in until late Friday night so she did not start it until Saturday. She states the swelling has gotten slightly better. She notes pain to the pulp.     Occupation:       I have personally reviewed all the relevant PMH, PSH, SH, FH, Medications and allergies      PAST MEDICAL HISTORY:  Past Medical History:   Diagnosis Date    H/O amenorrhea     H/O menorrhagia     History of IBS     Hx of acne     Hypertension        PAST SURGICAL  HISTORY:  Past Surgical History:   Procedure Laterality Date    BREAST EXCISIONAL BIOPSY Right     benign    CHOLECYSTECTOMY      DILATION AND CURETTAGE OF UTERUS      ENDOMETRIAL BIOPSY      GALLBLADDER SURGERY         FAMILY HISTORY:  Family History   Problem Relation Age of Onset    No Known Problems Mother     No Known Problems Father     No Known Problems Sister     Colon cancer Maternal Grandmother 78    No Known Problems Maternal Grandfather     No Known Problems Paternal Grandmother     No Known Problems Paternal Grandfather     No Known Problems Brother     No Known Problems Son     No Known Problems Son     No Known Problems Son     No Known Problems Maternal Aunt     No Known Problems Maternal Aunt     Breast cancer Neg Hx        SOCIAL HISTORY:  Social History     Tobacco Use    Smoking status: Former     Current packs/day: 0.00     Types: Cigarettes     Quit date:      Years since quittin.7    Smokeless tobacco: Never   Vaping Use    Vaping status: Never Used   Substance Use Topics    Alcohol use: Yes     Comment: 1 per week    Drug use: No       MEDICATIONS:    Current Outpatient Medications:     Calcium Citrate-Vitamin D (CALCIUM + D PO), Take by mouth in the morning, Disp: , Rfl:     estradiol (ESTRACE VAGINAL) 0.1 mg/g vaginal cream, 2g intravaginally at bedtime for one week, then 1g intravaginally 2 times per week, Disp: 42.5 g, Rfl: 3    fluticasone (FLONASE) 50 mcg/act nasal spray, 2 sprays into each nostril Daily, Disp: , Rfl:     losartan (COZAAR) 50 mg tablet, Take 1 tablet by mouth daily, Disp: , Rfl:     Magnesium 250 MG TABS, Take by mouth in the morning, Disp: , Rfl:     sulfamethoxazole-trimethoprim (BACTRIM DS) 800-160 mg per tablet, Take 1 tablet by mouth every 12 (twelve) hours for 7 days, Disp: 14 tablet, Rfl: 0    meloxicam (MOBIC) 7.5 mg tablet, TAKE ONE TABLET BY MOUTH EVERY DAY (Patient not taking: Reported on 2024), Disp: 90 tablet, Rfl:  "1    ALLERGIES:  Allergies   Allergen Reactions    Penicillins Rash           REVIEW OF SYSTEMS:  Musculoskeletal:        As noted in HPI.   All other systems reviewed and are negative.    VITALS:  Vitals:    09/24/24 0845   BP: 130/74   Pulse: 85       LABS:  HgA1c: No results found for: \"HGBA1C\"  BMP:   Lab Results   Component Value Date    CALCIUM 8.8 12/02/2023    K 4.2 12/02/2023    CO2 29 12/02/2023     12/02/2023    BUN 18 12/02/2023    CREATININE 0.88 12/02/2023       _____________________________________________________  PHYSICAL EXAMINATION:  General: Well developed and well nourished, alert & oriented x 3, appears comfortable  Psychiatric: Normal  HEENT: Normocephalic, Atraumatic Trachea Midline, No torticollis  Pulmonary: No audible wheezing or respiratory distress   Abdomen/GI: Non tender, non distended   Cardiovascular: No pitting edema, 2+ radial pulse   Skin: No Masses, No Lacerations, No Ulcerations  Neurovascular: Sensation Intact to the Median, Ulnar, Radial Nerve, Motor Intact to the Median, Ulnar, Radial Nerve, and Pulses Intact  Musculoskeletal: Normal, except as noted in detailed exam and in HPI.      MUSCULOSKELETAL EXAMINATION:  Right thumb  Erythema and swelling to the pulp of the thumb  Erythema and swelling with mild improvement compared to prior examination.  Non tender along the tendon sheath  No palpable fluctuance  No pain along flexor tendon sheath  ___________________________________________________  STUDIES REVIEWED:  No new studies to review         PROCEDURES PERFORMED:  Procedures  No Procedures performed today    _____________________________________________________      Scribe Attestation      I,:  Jo-Ann Maurer MA am acting as a scribe while in the presence of the attending physician.:       I,:  Narciso Ramey MD personally performed the services described in this documentation    as scribed in my presence.:           "

## 2024-09-25 ENCOUNTER — HOSPITAL ENCOUNTER (OUTPATIENT)
Facility: AMBULARY SURGERY CENTER | Age: 59
Setting detail: OUTPATIENT SURGERY
Discharge: HOME | End: 2024-09-25
Attending: STUDENT IN AN ORGANIZED HEALTH CARE EDUCATION/TRAINING PROGRAM | Admitting: STUDENT IN AN ORGANIZED HEALTH CARE EDUCATION/TRAINING PROGRAM
Payer: COMMERCIAL

## 2024-09-25 VITALS
TEMPERATURE: 97.5 F | HEART RATE: 79 BPM | OXYGEN SATURATION: 98 % | DIASTOLIC BLOOD PRESSURE: 74 MMHG | RESPIRATION RATE: 18 BRPM | SYSTOLIC BLOOD PRESSURE: 144 MMHG

## 2024-09-25 DIAGNOSIS — L03.011 FELON OF FINGER OF RIGHT HAND: ICD-10-CM

## 2024-09-25 DIAGNOSIS — M25.441 FINGER JOINT SWELLING, RIGHT: ICD-10-CM

## 2024-09-25 PROCEDURE — 87205 SMEAR GRAM STAIN: CPT | Performed by: STUDENT IN AN ORGANIZED HEALTH CARE EDUCATION/TRAINING PROGRAM

## 2024-09-25 PROCEDURE — 11042 DBRDMT SUBQ TIS 1ST 20SQCM/<: CPT | Performed by: PHYSICIAN ASSISTANT

## 2024-09-25 PROCEDURE — 87075 CULTR BACTERIA EXCEPT BLOOD: CPT | Performed by: STUDENT IN AN ORGANIZED HEALTH CARE EDUCATION/TRAINING PROGRAM

## 2024-09-25 PROCEDURE — 87070 CULTURE OTHR SPECIMN AEROBIC: CPT | Performed by: STUDENT IN AN ORGANIZED HEALTH CARE EDUCATION/TRAINING PROGRAM

## 2024-09-25 PROCEDURE — 11042 DBRDMT SUBQ TIS 1ST 20SQCM/<: CPT | Performed by: STUDENT IN AN ORGANIZED HEALTH CARE EDUCATION/TRAINING PROGRAM

## 2024-09-25 RX ORDER — SULFAMETHOXAZOLE/TRIMETHOPRIM 800-160 MG
1 TABLET ORAL EVERY 12 HOURS SCHEDULED
Qty: 14 TABLET | Refills: 0 | Status: SHIPPED | OUTPATIENT
Start: 2024-09-25 | End: 2024-10-02

## 2024-09-25 RX ORDER — HYDROCODONE BITARTRATE AND ACETAMINOPHEN 5; 325 MG/1; MG/1
1 TABLET ORAL EVERY 6 HOURS PRN
Qty: 5 TABLET | Refills: 0 | Status: SHIPPED | OUTPATIENT
Start: 2024-09-25

## 2024-09-25 RX ORDER — HYDROCODONE BITARTRATE AND ACETAMINOPHEN 5; 325 MG/1; MG/1
1 TABLET ORAL ONCE AS NEEDED
Status: DISCONTINUED | OUTPATIENT
Start: 2024-09-25 | End: 2024-09-25 | Stop reason: HOSPADM

## 2024-09-25 RX ORDER — MAGNESIUM HYDROXIDE 1200 MG/15ML
LIQUID ORAL AS NEEDED
Status: DISCONTINUED | OUTPATIENT
Start: 2024-09-25 | End: 2024-09-25 | Stop reason: HOSPADM

## 2024-09-25 NOTE — QUICK NOTE
Patient was evaluated and meets criteria for discharge and is medically cleared to leave the facility without a responsible person to escort them home.

## 2024-09-25 NOTE — OP NOTE
OPERATIVE REPORT  PATIENT NAME: Paola Story    :  1965  MRN: [id number]  Pt Location: [facility]    SURGERY DATE: 2024     Surgeons and Role:     * Narciso Ramey MD - Primary     * Keyonna Jeong PA-C - Assisting    Physician Assistant need: A physician assistant was required during the procedure for retraction, tissue handling, dissection, and suturing. No qualified resident was available.    Pre-Op Diagnosis Codes:   Suspected felon of right thumb    Post-Op Diagnosis Codes:  Suspected felon of right thumb    Procedure(s) (LRB):  INCISION AND DRAINAGE (I&D) RIGHT THUMB (Right)    Specimen(s):  ID Type Source Tests Collected by Time Destination   A : Right thumb Tissue Finger, Right ANAEROBIC CULTURE AND GRAM STAIN, CULTURE, TISSUE AND GRAM STAIN Narciso Ramey MD 2024  8:26 AM        Estimated Blood Loss:   Minimal    Drains:  None    Implants:  * No implants in log *    Anesthesia Type:   Local    Operative Indications:  Patient is a 59 y.o. female evaluated in clinic with 6 week history of thumb pulp swelling, erythema, and pain. Patient was treated with oral antibiotics and symptoms were refractory to this. We discussed treatment options with patient including conservative management and surgical management. Discussed nonoperative management with continued antibiotics and observation.  We discussed risk of surgery including pain, bleeding, stiffness, damage to neurovascular structures, need for therapy, continued infection, need for long term antibiotics. Given continued symptoms after 6 weeks time, discussed we had concern for low grade infection to pulp of thumb. Patient elected for surgical management.  Informed consent was obtained.     Operative Findings:  Irrigation and debridement of left thumb was performed. No discrete fluid collection or obviously infected tissues were noted.     Complications:   None     Procedure and Technique:  Patient was identified in the  preoperative holding area.  Surgical sites were marked with patient participation. A 50-50 mixture of 1% lidocaine without epinephrine and 0.25% bupivacaine without epinephrine was used for local field block. Patient was taken back to the operating theater.  Extremity was prepped and draped in typical fashion.  Formal time-out was performed confirming site, patient, and procedure. All present were in agreement.  Tourniquet was inflated to 250 mmHg.       1.5 cm longitudinal incision made over radial aspect of thumb. Blunt dissection performed. No discrete fluid collections were noted.  A hemostat was introduced and used to break up soft tissues from bone in all directions. There was no grossly infected appearing tissues. Cultures were taken. Tourniquet was deflated. Curette was used to gently debride skin and subcutaneous tissue. Wounds were thoroughly irrigated with 3 L of normal saline. All remaining tissues appeared viable. Skin was closed with 4-0 nylon in horizontal fashion.  Wounds were dressed with telfa, 4x4s, cast padding, Ace bandage.     Patient was taken to recovery in stable condition.     I was present for the entire procedure     Postoperative Plan:  Patient may range digits as tolerated.  We will follow cultures and tailor antibiotics accordingly. We will follow patient's clinical exam for improvement.    Patient Disposition:  Recovery        SIGNATURE: Narciso Ramey MD  DATE: September 25, 2024  TIME: 8:43 AM

## 2024-09-25 NOTE — INTERVAL H&P NOTE
H&P reviewed. After examining the patient I find no changes in the patients condition since the H&P had been written.      
H&P reviewed. After examining the patient I find no changes in the patients condition since the H&P had been written. ASA 2      
No

## 2024-09-25 NOTE — DISCHARGE INSTR - AVS FIRST PAGE
Post Operative Instructions    You have had surgery on your arm today, please read and follow the information below:  Elevate your hand above your elbow during the next 24-48 hours to help with swelling.  Place your hand and arm over your head with motion at your shoulder three times a day.  Do not apply any cream/ointment/oil to your incisions including antibiotics (I.e.Neosporin)  Do not use peroxide to clean your wound   Do not soak your hands in standing water (dishwater, tubs, Jacuzzi's, pools, etc.) until given permission (typically 2-3 weeks after injury)    Call the office if you notice any:  Increased numbness or tingling of your hand or fingers that is not relieved with elevation.  Increasing pain that is not controlled with medication.  Difficulty chewing, breathing, swallowing.  Chest pains or shortness of breath.  Fever over 101.4 degrees.    Bandage: Do NOT remove bandage until Friday 9/27/24. At that time, you may remove your surgical bandage and wash the finger gently with soap and water in the shower, then recover with a Band-Aid.     Motion: Move fingers into a fist 5 times a day, DO NOT move any splinted fingers.    Weight bearing status: Avoid heavy lifting (>2 pounds) with the extremity that was operated on until follow up appointment. Normal activities of daily living are OK.    Ice: Ice for 10 minutes every hour as needed for swelling x 24 hours.    Sling: No sling necessary.    Pain medication:   Norco/Hydrocodone one tab every 6 hours AS NEEDED for pain  *   You may take Tylenol (acetaminophen) in addition to your pain medication. This is over the counter. Please follow the instructions on the bottle. BE AWARE - your pain medication (hydrocodone/oxycodone) may already include acetaminophen in it. If it does, you must include this in your daily amount and make sure not take more than 3000 mg per day.   *   You may take an antiinflammatory medication (i.e. ibuprofen/naproxen) in addition to  your pain medication. These are found over the counter, but higher prescription doses are available if needed. Please follow the dosing instructions on the bottle and it is recommended you take these with food. DO NOT take these medications if you are on a blood thinner, have history of gastrointestinal bleeding, chronic kidney disease, or if you have ever been told by a physician not to. You CAN take this with Tylenol (acetaminophen), but should only take ONE antiinflammatory at a time.    Antibiotics:  Bactrim DS (trimethoprim/sulfamethoxazole) 1 tab every 12 hours - another week's worth of antibiotics has been sent to your pharmacy. Please make sure to complete your entire 2 week prescription.     Therapy: No therapy needed at this time.    Follow-up Appointment: 7-10 days.        Please call the office if you have any questions or concerns regarding your post-operative care.

## 2024-09-28 LAB
BACTERIA SPEC ANAEROBE CULT: NO GROWTH
BACTERIA TISS AEROBE CULT: NO GROWTH
GRAM STN SPEC: NORMAL

## 2024-10-01 ENCOUNTER — OFFICE VISIT (OUTPATIENT)
Dept: OBGYN CLINIC | Facility: CLINIC | Age: 59
End: 2024-10-01

## 2024-10-01 VITALS
BODY MASS INDEX: 34.74 KG/M2 | WEIGHT: 184 LBS | DIASTOLIC BLOOD PRESSURE: 63 MMHG | HEIGHT: 61 IN | SYSTOLIC BLOOD PRESSURE: 98 MMHG | HEART RATE: 91 BPM

## 2024-10-01 DIAGNOSIS — Z47.89 AFTERCARE FOLLOWING SURGERY OF THE MUSCULOSKELETAL SYSTEM: Primary | ICD-10-CM

## 2024-10-01 PROCEDURE — 99024 POSTOP FOLLOW-UP VISIT: CPT | Performed by: STUDENT IN AN ORGANIZED HEALTH CARE EDUCATION/TRAINING PROGRAM

## 2024-10-01 NOTE — PROGRESS NOTES
Assessment/Plan:  Patient ID: 59 y.o. female   Surgery: Incision And Drainage (i&d) Right Thumb - Right  Date of Surgery: 9/25/2024    The patient is doing well postoperatively. There is mild hyperemia to thumb pulp. Swelling improved from preoperatively. She will continue with the antibiotics as prescribed. Updated clinical images were taken and uploaded into the chart. The cultures were reviewed which demonstrate no growth. She can continue to work on motion and can use the thumb for activities.    Follow Up:  1 week    To Do Next Visit:  Re-evaluation of current issue      CHIEF COMPLAINT:  Chief Complaint   Patient presents with    Right Thumb - Post-op         SUBJECTIVE:  Patient is a 59 y.o. year old female who presents for follow up after Incision And Drainage (i&d) Right Thumb - Right. Today patient states she is not doing better. She notes numbness to the tip of the finger.     Occupation:        PHYSICAL EXAMINATION:  General: Well developed and well nourished, alert and oriented x 3, appears comfortable  Psychiatric: Normal    MUSCULOSKELETAL EXAMINATION:  Incision: Clean, dry, intact  Surgery Site: normal, no evidence of infection . Mild hyperemia to thumb pulp. Improved erythema compared to preop. Moderate swelling, improved from preop. No drainage.   Range of Motion: As expected  Neurovascular status: Neuro intact, good cap refill         STUDIES REVIEWED:  Lab results were reviewed and independently interpreted by Dr. Ramey and demonstrate no growth.        PROCEDURES PERFORMED:  Procedures  No Procedures performed today    Scribe Attestation      I,:  Jo-Ann Maurer MA am acting as a scribe while in the presence of the attending physician.:       I,:  Narciso Ramey MD personally performed the services described in this documentation    as scribed in my presence.:

## 2024-10-08 ENCOUNTER — OFFICE VISIT (OUTPATIENT)
Dept: OBGYN CLINIC | Facility: CLINIC | Age: 59
End: 2024-10-08
Payer: COMMERCIAL

## 2024-10-08 VITALS — BODY MASS INDEX: 34.74 KG/M2 | HEIGHT: 61 IN | WEIGHT: 184 LBS

## 2024-10-08 DIAGNOSIS — M25.641 FINGER STIFFNESS, RIGHT: Primary | ICD-10-CM

## 2024-10-08 PROCEDURE — 99213 OFFICE O/P EST LOW 20 MIN: CPT | Performed by: STUDENT IN AN ORGANIZED HEALTH CARE EDUCATION/TRAINING PROGRAM

## 2024-10-08 NOTE — PROGRESS NOTES
Assessment/Plan:  Patient ID: 59 y.o. female   Surgery: Incision And Drainage (i&d) Right Thumb - Right  Date of Surgery: 9/25/2024    Overall, pt is improved.  Discussed that it is normal to have some pain and stiffness from the surgery.  In addition, since she's had some difficulty moving the thumb for so long, it also could be stiff and sore from that.   We discussed we could always send her to therapy, but pt politely declined this today.   Encouraged patient this should continue to improve with time.  Discussed scar massage.     Follow Up:  4 weeks    To Do Next Visit:  Re-evaluation of current issue      CHIEF COMPLAINT:  Chief Complaint   Patient presents with    Right Thumb - Post-op, Swelling, Pain     Feels hot          SUBJECTIVE:  Patient is a 59 y.o. year old female who presents for follow up after Incision And Drainage (i&d) Right Thumb - Right. Pt prescribed Bactrim after surgery. Pt states she can tell the finger is healing from surgery, but she still has pain and stiffness in her IP joint and difficulty with things such as holding a pen. She states swelling has improved from preoperative evaluation    Occupation:     PHYSICAL EXAMINATION:  General: Well developed and well nourished, alert and oriented x 3, appears comfortable  Psychiatric: Normal    MUSCULOSKELETAL EXAMINATION:  Incision: Clean, dry, intact   Surgery Site: normal, no evidence of infection   Range of Motion: As expected, improved  Neurovascular status: Neuro intact (pt describes some tingling to thumb tip), good cap refill  Mild swelling to thumb tip  No erythema  Mild hyperemia to volar aspect of thumb  Done today: Sutures out      STUDIES REVIEWED:  Previous lab results were reviewed and show no growth an aerobic or anaerobic cultures.  Prior x-rays of right thumb reviewed      PROCEDURES PERFORMED:  Procedures  No Procedures performed today    Scribe Attestation      I,:  Keyonna Jeong PA-C am acting as a scribe  while in the presence of the attending physician.:       I,:  Narciso Ramey MD personally performed the services described in this documentation    as scribed in my presence.:

## 2024-10-19 ENCOUNTER — HOSPITAL ENCOUNTER (OUTPATIENT)
Dept: RADIOLOGY | Age: 59
Discharge: HOME | End: 2024-10-19
Payer: COMMERCIAL

## 2024-10-19 VITALS — WEIGHT: 184 LBS | HEIGHT: 61 IN | BODY MASS INDEX: 34.74 KG/M2

## 2024-10-19 DIAGNOSIS — Z12.31 ENCOUNTER FOR SCREENING MAMMOGRAM FOR MALIGNANT NEOPLASM OF BREAST: ICD-10-CM

## 2024-10-19 PROCEDURE — 77063 BREAST TOMOSYNTHESIS BI: CPT

## 2024-10-19 PROCEDURE — 77067 SCR MAMMO BI INCL CAD: CPT

## 2024-11-12 ENCOUNTER — OFFICE VISIT (OUTPATIENT)
Dept: OBGYN CLINIC | Facility: CLINIC | Age: 59
End: 2024-11-12
Payer: COMMERCIAL

## 2024-11-12 VITALS
HEIGHT: 61 IN | BODY MASS INDEX: 34.74 KG/M2 | DIASTOLIC BLOOD PRESSURE: 79 MMHG | HEART RATE: 76 BPM | SYSTOLIC BLOOD PRESSURE: 133 MMHG | WEIGHT: 184 LBS

## 2024-11-12 DIAGNOSIS — M79.89 FINGER SWELLING: Primary | ICD-10-CM

## 2024-11-12 PROCEDURE — 99214 OFFICE O/P EST MOD 30 MIN: CPT | Performed by: STUDENT IN AN ORGANIZED HEALTH CARE EDUCATION/TRAINING PROGRAM

## 2024-11-12 NOTE — PROGRESS NOTES
Assessment/Plan:  Patient ID: 59 y.o. female   Surgery: Incision And Drainage (i&d) Right Thumb - Right  Date of Surgery: 9/25/2024    We discussed that patient's presentation is improved from initial presentation.  I'm not completely sure where her residual symptoms are coming from, perhaps they just need more time, but she does not currently have any signs of active infx.   Discussed potential for therapy to help desensitize the area, but pt states she did not get much relief from this before for other issues so politely declined.   Pt also provided with a silicone sleeve that she can use as needed for the thumb.  Can call if any questions/concerns.     Follow Up:  6 weeks PRN    To Do Next Visit:  Re-evaluation of current issue      CHIEF COMPLAINT:  Chief Complaint   Patient presents with    Post-op     Patient is still having swelling and irritation in the thumb         SUBJECTIVE:  Patient is a 59 y.o. year old female who presents for follow up after Incision And Drainage (i&d) Right Thumb - Right. Today patient states the pain is better, but she continues to have pain with writing and some tingling/tenderness along the incision. States the numbness in the fingertip is mostly gone. States swelling is present but improved.     Occupation:      PHYSICAL EXAMINATION:  General: Well developed and well nourished, alert and oriented x 3, appears comfortable  Psychiatric: Normal    MUSCULOSKELETAL EXAMINATION:  Incision: Clean, dry, intact  Surgery Site: normal, no evidence of infection   Range of Motion: As expected, similar to contralateral side  Neurovascular status: Neuro intact, good cap refill  Right thumb: mild swelling compared to contralateral side. No fluctuance.       STUDIES REVIEWED:  Cultures from 9/25/24: No growth  CRP and ESR from 9/5 reviewed  X-ray of right thumb from 9/5/24 reviewed    PROCEDURES PERFORMED:  Procedures  No Procedures performed today    Scribe Attestation      I,:   Keyonna Jeong PA-C am acting as a scribe while in the presence of the attending physician.:       I,:  Narciso Ramey MD personally performed the services described in this documentation    as scribed in my presence.:

## 2024-11-22 ENCOUNTER — APPOINTMENT (OUTPATIENT)
Dept: LAB | Facility: CLINIC | Age: 59
End: 2024-11-22
Payer: COMMERCIAL

## 2024-11-22 DIAGNOSIS — J30.9 SPASMODIC RHINORRHEA: ICD-10-CM

## 2024-11-22 DIAGNOSIS — I10 ESSENTIAL HYPERTENSION, MALIGNANT: ICD-10-CM

## 2024-11-22 DIAGNOSIS — E78.5 HYPERLIPIDEMIA, UNSPECIFIED HYPERLIPIDEMIA TYPE: ICD-10-CM

## 2024-11-22 DIAGNOSIS — M79.644 PAIN IN FINGER OF RIGHT HAND: ICD-10-CM

## 2024-11-22 LAB
ALBUMIN SERPL BCG-MCNC: 3.9 G/DL (ref 3.5–5)
ALP SERPL-CCNC: 63 U/L (ref 34–104)
ALT SERPL W P-5'-P-CCNC: 10 U/L (ref 7–52)
ANION GAP SERPL CALCULATED.3IONS-SCNC: 5 MMOL/L (ref 4–13)
AST SERPL W P-5'-P-CCNC: 14 U/L (ref 13–39)
BASOPHILS # BLD AUTO: 0.03 THOUSANDS/ÂΜL (ref 0–0.1)
BASOPHILS NFR BLD AUTO: 1 % (ref 0–1)
BILIRUB SERPL-MCNC: 0.6 MG/DL (ref 0.2–1)
BUN SERPL-MCNC: 16 MG/DL (ref 5–25)
CALCIUM SERPL-MCNC: 9.2 MG/DL (ref 8.4–10.2)
CHLORIDE SERPL-SCNC: 104 MMOL/L (ref 96–108)
CHOLEST SERPL-MCNC: 180 MG/DL (ref ?–200)
CO2 SERPL-SCNC: 31 MMOL/L (ref 21–32)
CREAT SERPL-MCNC: 0.95 MG/DL (ref 0.6–1.3)
EOSINOPHIL # BLD AUTO: 0.08 THOUSAND/ÂΜL (ref 0–0.61)
EOSINOPHIL NFR BLD AUTO: 2 % (ref 0–6)
ERYTHROCYTE [DISTWIDTH] IN BLOOD BY AUTOMATED COUNT: 13.8 % (ref 11.6–15.1)
GFR SERPL CREATININE-BSD FRML MDRD: 65 ML/MIN/1.73SQ M
GLUCOSE P FAST SERPL-MCNC: 88 MG/DL (ref 65–99)
HCT VFR BLD AUTO: 39.1 % (ref 34.8–46.1)
HDLC SERPL-MCNC: 57 MG/DL
HGB BLD-MCNC: 12.6 G/DL (ref 11.5–15.4)
IMM GRANULOCYTES # BLD AUTO: 0 THOUSAND/UL (ref 0–0.2)
IMM GRANULOCYTES NFR BLD AUTO: 0 % (ref 0–2)
LDLC SERPL CALC-MCNC: 104 MG/DL (ref 0–100)
LYMPHOCYTES # BLD AUTO: 1.02 THOUSANDS/ÂΜL (ref 0.6–4.47)
LYMPHOCYTES NFR BLD AUTO: 26 % (ref 14–44)
MCH RBC QN AUTO: 29.8 PG (ref 26.8–34.3)
MCHC RBC AUTO-ENTMCNC: 32.2 G/DL (ref 31.4–37.4)
MCV RBC AUTO: 92 FL (ref 82–98)
MONOCYTES # BLD AUTO: 0.31 THOUSAND/ÂΜL (ref 0.17–1.22)
MONOCYTES NFR BLD AUTO: 8 % (ref 4–12)
NEUTROPHILS # BLD AUTO: 2.56 THOUSANDS/ÂΜL (ref 1.85–7.62)
NEUTS SEG NFR BLD AUTO: 63 % (ref 43–75)
NONHDLC SERPL-MCNC: 123 MG/DL
NRBC BLD AUTO-RTO: 0 /100 WBCS
PLATELET # BLD AUTO: 181 THOUSANDS/UL (ref 149–390)
PMV BLD AUTO: 9.5 FL (ref 8.9–12.7)
POTASSIUM SERPL-SCNC: 4 MMOL/L (ref 3.5–5.3)
PROT SERPL-MCNC: 7 G/DL (ref 6.4–8.4)
RBC # BLD AUTO: 4.23 MILLION/UL (ref 3.81–5.12)
SODIUM SERPL-SCNC: 140 MMOL/L (ref 135–147)
T4 FREE SERPL-MCNC: 0.94 NG/DL (ref 0.61–1.12)
TRIGL SERPL-MCNC: 94 MG/DL (ref ?–150)
TSH SERPL DL<=0.05 MIU/L-ACNC: 3.65 UIU/ML (ref 0.45–4.5)
WBC # BLD AUTO: 4 THOUSAND/UL (ref 4.31–10.16)

## 2024-11-22 PROCEDURE — 84439 ASSAY OF FREE THYROXINE: CPT

## 2024-11-22 PROCEDURE — 84443 ASSAY THYROID STIM HORMONE: CPT

## 2024-11-22 PROCEDURE — 80053 COMPREHEN METABOLIC PANEL: CPT

## 2024-11-22 PROCEDURE — 36415 COLL VENOUS BLD VENIPUNCTURE: CPT

## 2024-11-22 PROCEDURE — 85025 COMPLETE CBC W/AUTO DIFF WBC: CPT

## 2024-11-22 PROCEDURE — 80061 LIPID PANEL: CPT

## 2024-12-31 ENCOUNTER — APPOINTMENT (OUTPATIENT)
Dept: RADIOLOGY | Facility: AMBULARY SURGERY CENTER | Age: 59
End: 2024-12-31
Payer: COMMERCIAL

## 2024-12-31 ENCOUNTER — OFFICE VISIT (OUTPATIENT)
Dept: OBGYN CLINIC | Facility: CLINIC | Age: 59
End: 2024-12-31
Payer: COMMERCIAL

## 2024-12-31 VITALS — WEIGHT: 184 LBS | BODY MASS INDEX: 34.74 KG/M2 | HEIGHT: 61 IN

## 2024-12-31 DIAGNOSIS — Z47.89 AFTERCARE FOLLOWING SURGERY OF THE MUSCULOSKELETAL SYSTEM: Primary | ICD-10-CM

## 2024-12-31 DIAGNOSIS — L03.011 FELON OF FINGER OF RIGHT HAND: ICD-10-CM

## 2024-12-31 DIAGNOSIS — Z47.89 AFTERCARE FOLLOWING SURGERY OF THE MUSCULOSKELETAL SYSTEM: ICD-10-CM

## 2024-12-31 DIAGNOSIS — M79.644 FINGER PAIN, RIGHT: ICD-10-CM

## 2024-12-31 PROCEDURE — 73140 X-RAY EXAM OF FINGER(S): CPT

## 2024-12-31 PROCEDURE — 99214 OFFICE O/P EST MOD 30 MIN: CPT | Performed by: STUDENT IN AN ORGANIZED HEALTH CARE EDUCATION/TRAINING PROGRAM

## 2024-12-31 NOTE — PROGRESS NOTES
ORTHOPAEDIC HAND, WRIST, AND ELBOW OFFICE  VISIT      ASSESSMENT/PLAN:      Diagnoses and all orders for this visit:    Aftercare following surgery of the musculoskeletal system  -     XR thumb right first digit-min 2v; Future    Felon of finger of right hand    Finger pain, right  -     Ambulatory Referral to PT/OT Hand Therapy; Future          59 y.o. female s/p I&D R thumb 9/25/24.   Discussed with pt that at this point there are no signs of infx and the appearance from previous felon has overall improved.  At this point, her XRs didn't show any concerning findings either  Her residual pain is likely soft tissue related.  Discussed potential for beginnings of a trigger finger as she does have some ttp along the A1 pulley.  Also discussed potential for UCL strain as there is some laxity on exam today.  Treatment options and expected outcomes were discussed in the form of therapy, TF steroid injx, or potentially even MRI evaluation.  The patient verbalized understanding of exam findings and treatment plan.   The patient was given the opportunity to ask questions.  Questions were answered to the patient's satisfaction.  The patient decided to move forward with therapy for now.  She was also provided with additional silipose sleeves as this has been helpful.      Follow Up:  2 months PRN      To Do Next Visit:  Re-evaluation of current issue        Narciso Ramey MD  Attending, Orthopaedic Surgery  Hand, Wrist, and Elbow Surgery  [facility]    ______________________________________________________________________________________________    CHIEF COMPLAINT:  Chief Complaint   Patient presents with   • Right Thumb - Post-op, Pain             SUBJECTIVE:  Patient is a 59 y.o. RHD female who presents today for follow up s/p I&D R thumb 9/25/24. Pt states that while she has noticed improvement with overall swelling to the thumb, she continues to have pain and difficulty using this. States the  pain and residual swelling is more along the ulnar IP joint. Feels like this may be unstable as well. She does play oboe and states it does rest on this area. Regarding numbness, this is just around the incision on the radial side.      Occupation:     I have personally reviewed all the relevant PMH, PSH, SH, FH, Medications and allergies      PAST MEDICAL HISTORY:  Past Medical History:   Diagnosis Date   • H/O amenorrhea    • H/O menorrhagia    • History of IBS    • Hx of acne    • Hypertension        PAST SURGICAL HISTORY:  Past Surgical History:   Procedure Laterality Date   • BREAST EXCISIONAL BIOPSY Right     benign   • CHOLECYSTECTOMY     • DILATION AND CURETTAGE OF UTERUS     • ENDOMETRIAL BIOPSY     • GALLBLADDER SURGERY     • SD INCISION & DRAINAGE ABSCESS SIMPLE/SINGLE Right 2024    Procedure: INCISION AND DRAINAGE (I&D) RIGHT THUMB;  Surgeon: Narciso Ramey MD;  Location: [facility] MAIN OR;  Service: Orthopedics       FAMILY HISTORY:  Family History   Problem Relation Age of Onset   • No Known Problems Mother    • No Known Problems Father    • No Known Problems Sister    • Colon cancer Maternal Grandmother 78   • No Known Problems Maternal Grandfather    • No Known Problems Paternal Grandmother    • No Known Problems Paternal Grandfather    • No Known Problems Brother    • No Known Problems Son    • No Known Problems Son    • No Known Problems Son    • No Known Problems Maternal Aunt    • No Known Problems Maternal Aunt    • Breast cancer Neg Hx        SOCIAL HISTORY:  Social History     Tobacco Use   • Smoking status: Former     Current packs/day: 0.00     Types: Cigarettes     Quit date:      Years since quittin.0   • Smokeless tobacco: Never   Vaping Use   • Vaping status: Never Used   Substance Use Topics   • Alcohol use: Yes     Comment: 1 per week   • Drug use: No       MEDICATIONS:    Current Outpatient Medications:   •  Calcium Citrate-Vitamin D (CALCIUM + D PO),  "Take by mouth in the morning, Disp: , Rfl:   •  estradiol (ESTRACE VAGINAL) 0.1 mg/g vaginal cream, 2g intravaginally at bedtime for one week, then 1g intravaginally 2 times per week, Disp: 42.5 g, Rfl: 3  •  losartan (COZAAR) 50 mg tablet, Take 1 tablet by mouth daily, Disp: , Rfl:   •  Magnesium 250 MG TABS, Take by mouth in the morning, Disp: , Rfl:     ALLERGIES:  Allergies   Allergen Reactions   • Penicillins Rash           REVIEW OF SYSTEMS:  Musculoskeletal:        As noted in HPI.   All other systems reviewed and are negative.    VITALS:  There were no vitals filed for this visit.    LABS:  HgA1c: No results found for: \"HGBA1C\"  BMP:   Lab Results   Component Value Date    CALCIUM 9.2 11/22/2024    K 4.0 11/22/2024    CO2 31 11/22/2024     11/22/2024    BUN 16 11/22/2024    CREATININE 0.95 11/22/2024       _____________________________________________________  PHYSICAL EXAMINATION:  General: Well developed and well nourished, alert & oriented x 3, appears comfortable  Psychiatric: Normal  HEENT: Normocephalic, Atraumatic Trachea Midline, No torticollis  Pulmonary: No audible wheezing or respiratory distress   Abdomen/GI: Non tender, non distended   Cardiovascular: No pitting edema, 2+ radial pulse   Skin: No masses, erythema, lacerations, fluctation, ulcerations  Neurovascular: Sensation Intact to the Median, Ulnar, Radial Nerve, Motor Intact to the Median, Ulnar, Radial Nerve, and Pulses Intact  Musculoskeletal: Normal, except as noted in detailed exam and in HPI.      MUSCULOSKELETAL EXAMINATION:  Right Thumb:  IP flexion 40 degrees (compared to 70 on the L)  IP joint hyper extends 30 degrees (similar to the L)  Some ttp along A1 pulley.  No active triggering today - exam limited 2/2 pain.  Perhaps some laxity along UCL of IP joint but + end feel.  Radial incision well healed and no erythema or evidence of infx.     ___________________________________________________  STUDIES REVIEWED:  Xrays of the " right thumb were reviewed and independently interpreted in PACS by Dr. Ramey and demonstrate no evidence of osteomyelitis, no concerning degenerative changes along the IP joint ulnar side, perhaps mild degeneration radially.          PROCEDURES PERFORMED:  Procedures  No Procedures performed today    _____________________________________________________      Scribe Attestation    I,:  Keyonna Jeong PA-C am acting as a scribe while in the presence of the attending physician.:       I,:  Narciso Ramey MD personally performed the services described in this documentation    as scribed in my presence.:

## (undated) DEVICE — CULTURE TUBE ANAEROBIC

## (undated) DEVICE — TELFA NON-ADHERENT ABSORBENT DRESSING: Brand: TELFA

## (undated) DEVICE — SPONGE SCRUB 4 PCT CHLORHEXIDINE

## (undated) DEVICE — GLOVE SRG BIOGEL 6.5

## (undated) DEVICE — OCCLUSIVE GAUZE STRIP,3% BISMUTH TRIBROMOPHENATE IN PETROLATUM BLEND: Brand: XEROFORM

## (undated) DEVICE — GLOVE INDICATOR PI UNDERGLOVE SZ 7 BLUE

## (undated) DEVICE — CYSTO TUBING TUR Y IRRIGATION

## (undated) DEVICE — GLOVE SRG BIOGEL ECLIPSE 7

## (undated) DEVICE — INTENDED FOR TISSUE SEPARATION, AND OTHER PROCEDURES THAT REQUIRE A SHARP SURGICAL BLADE TO PUNCTURE OR CUT.: Brand: BARD-PARKER ® CARBON RIB-BACK BLADES

## (undated) DEVICE — CULTURE TUBE AEROBIC

## (undated) DEVICE — STERILE BETHLEHEM PLASTIC HAND: Brand: CARDINAL HEALTH

## (undated) DEVICE — STRETCH BANDAGE: Brand: CURITY

## (undated) DEVICE — CUFF TOURNIQUET 18 X 4 IN QUICK CONNECT DISP 1 BLADDER